# Patient Record
Sex: MALE | Race: WHITE | NOT HISPANIC OR LATINO | URBAN - METROPOLITAN AREA
[De-identification: names, ages, dates, MRNs, and addresses within clinical notes are randomized per-mention and may not be internally consistent; named-entity substitution may affect disease eponyms.]

---

## 2020-12-14 ENCOUNTER — LAB REQUISITION (OUTPATIENT)
Dept: LAB | Facility: HOSPITAL | Age: 40
End: 2020-12-14
Payer: COMMERCIAL

## 2020-12-14 DIAGNOSIS — B95.8 UNSPECIFIED STAPHYLOCOCCUS AS THE CAUSE OF DISEASES CLASSIFIED ELSEWHERE: ICD-10-CM

## 2020-12-14 PROCEDURE — 87070 CULTURE OTHR SPECIMN AEROBIC: CPT | Performed by: DERMATOLOGY

## 2020-12-14 PROCEDURE — 87186 SC STD MICRODIL/AGAR DIL: CPT | Performed by: DERMATOLOGY

## 2020-12-14 PROCEDURE — 87205 SMEAR GRAM STAIN: CPT | Performed by: DERMATOLOGY

## 2020-12-17 LAB
BACTERIA WND AEROBE CULT: ABNORMAL
BACTERIA WND AEROBE CULT: ABNORMAL
GRAM STN SPEC: ABNORMAL
GRAM STN SPEC: ABNORMAL

## 2024-01-01 ENCOUNTER — APPOINTMENT (EMERGENCY)
Dept: RADIOLOGY | Facility: HOSPITAL | Age: 44
DRG: 064 | End: 2024-01-01
Payer: COMMERCIAL

## 2024-01-01 ENCOUNTER — APPOINTMENT (INPATIENT)
Dept: RADIOLOGY | Facility: HOSPITAL | Age: 44
DRG: 064 | End: 2024-01-01
Payer: COMMERCIAL

## 2024-01-01 ENCOUNTER — APPOINTMENT (OUTPATIENT)
Dept: RADIOLOGY | Facility: HOSPITAL | Age: 44
DRG: 064 | End: 2024-01-01
Payer: COMMERCIAL

## 2024-01-01 ENCOUNTER — HOSPITAL ENCOUNTER (INPATIENT)
Facility: HOSPITAL | Age: 44
LOS: 1 days | DRG: 064 | End: 2024-10-08
Attending: SURGERY | Admitting: SURGERY
Payer: COMMERCIAL

## 2024-01-01 VITALS
BODY MASS INDEX: 31.38 KG/M2 | RESPIRATION RATE: 17 BRPM | HEIGHT: 73 IN | SYSTOLIC BLOOD PRESSURE: 104 MMHG | WEIGHT: 236.77 LBS | HEART RATE: 116 BPM | TEMPERATURE: 98.6 F | DIASTOLIC BLOOD PRESSURE: 85 MMHG | OXYGEN SATURATION: 98 %

## 2024-01-01 DIAGNOSIS — I63.89 CEREBROVASCULAR ACCIDENT (CVA) DUE TO OTHER MECHANISM (HCC): Primary | ICD-10-CM

## 2024-01-01 DIAGNOSIS — I62.9 INTRACRANIAL HEMORRHAGE (HCC): ICD-10-CM

## 2024-01-01 LAB
ALBUMIN SERPL BCG-MCNC: 3.8 G/DL (ref 3.5–5)
ALP SERPL-CCNC: 85 U/L (ref 34–104)
ALT SERPL W P-5'-P-CCNC: 26 U/L (ref 7–52)
ANION GAP SERPL CALCULATED.3IONS-SCNC: 12 MMOL/L (ref 4–13)
ANION GAP SERPL CALCULATED.3IONS-SCNC: 14 MMOL/L (ref 4–13)
ANION GAP SERPL CALCULATED.3IONS-SCNC: 15 MMOL/L (ref 4–13)
ANION GAP SERPL CALCULATED.3IONS-SCNC: 9 MMOL/L (ref 4–13)
ANISOCYTOSIS BLD QL SMEAR: PRESENT
APTT PPP: 25 SECONDS (ref 23–34)
AST SERPL W P-5'-P-CCNC: 40 U/L (ref 13–39)
ATRIAL RATE: 153 BPM
AUER BODIES BLD QL SMEAR: PRESENT
AUER BODIES BLD QL SMEAR: PRESENT
BACTERIA UR QL AUTO: ABNORMAL /HPF
BASE EXCESS BLDA CALC-SCNC: -2.5 MMOL/L
BASO STIPL BLD QL SMEAR: PRESENT
BASOPHILS # BLD MANUAL: 0 THOUSAND/UL (ref 0–0.1)
BASOPHILS NFR MAR MANUAL: 0 % (ref 0–1)
BILIRUB SERPL-MCNC: 1.38 MG/DL (ref 0.2–1)
BILIRUB UR QL STRIP: NEGATIVE
BLASTS ABSOLUTE COUNT: 33.08 THOUSAND/UL (ref 0–0)
BLASTS ABSOLUTE COUNT: 37.1 THOUSAND/UL (ref 0–0)
BLASTS NFR BLD MANUAL: 39 %
BLASTS NFR BLD MANUAL: 40 %
BUN SERPL-MCNC: 16 MG/DL (ref 5–25)
BUN SERPL-MCNC: 17 MG/DL (ref 5–25)
CA-I BLD-SCNC: 1.12 MMOL/L (ref 1.12–1.32)
CALCIUM SERPL-MCNC: 7.9 MG/DL (ref 8.4–10.2)
CALCIUM SERPL-MCNC: 8.2 MG/DL (ref 8.4–10.2)
CALCIUM SERPL-MCNC: 8.5 MG/DL (ref 8.4–10.2)
CALCIUM SERPL-MCNC: 8.6 MG/DL (ref 8.4–10.2)
CHLORIDE SERPL-SCNC: 101 MMOL/L (ref 96–108)
CHLORIDE SERPL-SCNC: 101 MMOL/L (ref 96–108)
CHLORIDE SERPL-SCNC: 106 MMOL/L (ref 96–108)
CHLORIDE SERPL-SCNC: 116 MMOL/L (ref 96–108)
CLARITY UR: ABNORMAL
CO2 SERPL-SCNC: 20 MMOL/L (ref 21–32)
CO2 SERPL-SCNC: 22 MMOL/L (ref 21–32)
CO2 SERPL-SCNC: 23 MMOL/L (ref 21–32)
CO2 SERPL-SCNC: 24 MMOL/L (ref 21–32)
COLOR UR: ABNORMAL
CREAT SERPL-MCNC: 1.19 MG/DL (ref 0.6–1.3)
CREAT SERPL-MCNC: 1.21 MG/DL (ref 0.6–1.3)
CREAT SERPL-MCNC: 1.22 MG/DL (ref 0.6–1.3)
CREAT SERPL-MCNC: 1.28 MG/DL (ref 0.6–1.3)
DIFFERENTIAL COMMENT: ABNORMAL
EOSINOPHIL # BLD MANUAL: 1.9 THOUSAND/UL (ref 0–0.4)
EOSINOPHIL NFR BLD MANUAL: 2 % (ref 0–6)
ERYTHROCYTE [DISTWIDTH] IN BLOOD BY AUTOMATED COUNT: 18.5 % (ref 11.6–15.1)
ERYTHROCYTE [DISTWIDTH] IN BLOOD BY AUTOMATED COUNT: 18.9 % (ref 11.6–15.1)
ERYTHROCYTE [DISTWIDTH] IN BLOOD BY AUTOMATED COUNT: 19.2 % (ref 11.6–15.1)
ERYTHROCYTE [DISTWIDTH] IN BLOOD BY AUTOMATED COUNT: 19.3 % (ref 11.6–15.1)
EST. AVERAGE GLUCOSE BLD GHB EST-MCNC: 103 MG/DL
GFR SERPL CREATININE-BSD FRML MDRD: 68 ML/MIN/1.73SQ M
GFR SERPL CREATININE-BSD FRML MDRD: 72 ML/MIN/1.73SQ M
GFR SERPL CREATININE-BSD FRML MDRD: 72 ML/MIN/1.73SQ M
GFR SERPL CREATININE-BSD FRML MDRD: 74 ML/MIN/1.73SQ M
GLUCOSE SERPL-MCNC: 153 MG/DL (ref 65–140)
GLUCOSE SERPL-MCNC: 154 MG/DL (ref 65–140)
GLUCOSE SERPL-MCNC: 170 MG/DL (ref 65–140)
GLUCOSE SERPL-MCNC: 175 MG/DL (ref 65–140)
GLUCOSE SERPL-MCNC: 179 MG/DL (ref 65–140)
GLUCOSE SERPL-MCNC: 235 MG/DL (ref 65–140)
GLUCOSE SERPL-MCNC: 240 MG/DL (ref 65–140)
GLUCOSE SERPL-MCNC: 260 MG/DL (ref 65–140)
GLUCOSE SERPL-MCNC: 349 MG/DL (ref 65–140)
GLUCOSE SERPL-MCNC: 394 MG/DL (ref 65–140)
GLUCOSE UR STRIP-MCNC: NEGATIVE MG/DL
HBA1C MFR BLD: 5.2 %
HCO3 BLDA-SCNC: 23.5 MMOL/L (ref 22–28)
HCT VFR BLD AUTO: 31.8 % (ref 36.5–49.3)
HCT VFR BLD AUTO: 32.7 % (ref 36.5–49.3)
HCT VFR BLD AUTO: 36.3 % (ref 36.5–49.3)
HCT VFR BLD AUTO: 37.1 % (ref 36.5–49.3)
HGB BLD-MCNC: 10.3 G/DL (ref 12–17)
HGB BLD-MCNC: 11.2 G/DL (ref 12–17)
HGB BLD-MCNC: 11.9 G/DL (ref 12–17)
HGB BLD-MCNC: 9.8 G/DL (ref 12–17)
HGB UR QL STRIP.AUTO: ABNORMAL
HIV 1+2 AB+HIV1 P24 AG SERPL QL IA: NORMAL
HIV1 P24 AG SER QL: NORMAL
INR PPP: 1.24 (ref 0.85–1.19)
KETONES UR STRIP-MCNC: NEGATIVE MG/DL
LACTATE SERPL-SCNC: 1.5 MMOL/L (ref 0.5–2)
LACTATE SERPL-SCNC: 2 MMOL/L (ref 0.5–2)
LACTATE SERPL-SCNC: 3.9 MMOL/L (ref 0.5–2)
LEUKOCYTE ESTERASE UR QL STRIP: ABNORMAL
LYMPHOCYTES # BLD AUTO: 6.62 THOUSAND/UL (ref 0.6–4.47)
LYMPHOCYTES # BLD AUTO: 6.66 THOUSAND/UL (ref 0.6–4.47)
LYMPHOCYTES # BLD AUTO: 7 % (ref 14–44)
LYMPHOCYTES # BLD AUTO: 8 %
MACROCYTES BLD QL AUTO: PRESENT
MACROCYTES BLD QL AUTO: PRESENT
MAGNESIUM SERPL-MCNC: 2 MG/DL (ref 1.9–2.7)
MAGNESIUM SERPL-MCNC: 2.4 MG/DL (ref 1.9–2.7)
MCH RBC QN AUTO: 30.3 PG (ref 26.8–34.3)
MCH RBC QN AUTO: 30.7 PG (ref 26.8–34.3)
MCH RBC QN AUTO: 30.8 PG (ref 26.8–34.3)
MCH RBC QN AUTO: 31.2 PG (ref 26.8–34.3)
MCHC RBC AUTO-ENTMCNC: 30.8 G/DL (ref 31.4–37.4)
MCHC RBC AUTO-ENTMCNC: 30.9 G/DL (ref 31.4–37.4)
MCHC RBC AUTO-ENTMCNC: 31.5 G/DL (ref 31.4–37.4)
MCHC RBC AUTO-ENTMCNC: 32.1 G/DL (ref 31.4–37.4)
MCV RBC AUTO: 100 FL (ref 82–98)
MCV RBC AUTO: 97 FL (ref 82–98)
MCV RBC AUTO: 98 FL (ref 82–98)
MCV RBC AUTO: 98 FL (ref 82–98)
METAMYELOCYTES # BLD MANUAL: 1.65 THOUSAND/UL (ref 0–0.1)
METAMYELOCYTES NFR BLD MANUAL: 2 % (ref 0–1)
MONOCYTES # BLD AUTO: 32.25 THOUSAND/UL (ref 0–1.22)
MONOCYTES # BLD AUTO: 33.3 THOUSAND/UL (ref 0–1.22)
MONOCYTES NFR BLD AUTO: 39 % (ref 4–12)
MONOCYTES NFR BLD: 35 % (ref 4–12)
MYELOCYTE ABSOLUTE CT: 2.85 THOUSAND/UL (ref 0–0.1)
MYELOCYTES # BLD MANUAL: 0.83 THOUSAND/UL (ref 0–0.1)
MYELOCYTES NFR BLD MANUAL: 1 % (ref 0–1)
MYELOCYTES NFR BLD MANUAL: 3 % (ref 0–1)
NEUTROPHILS # BLD MANUAL: 13.32 THOUSAND/UL (ref 1.85–7.62)
NEUTS BAND NFR BLD MANUAL: 3 % (ref 0–8)
NEUTS BAND NFR BLD MANUAL: 3 % (ref 0–8)
NEUTS SEG # BLD: 8.27 THOUSAND/UL (ref 1.81–6.82)
NEUTS SEG NFR BLD AUTO: 11 % (ref 43–75)
NEUTS SEG NFR BLD AUTO: 7 %
NITRITE UR QL STRIP: NEGATIVE
NON-SQ EPI CELLS URNS QL MICRO: ABNORMAL /HPF
NRBC BLD AUTO-RTO: 12 /100 WBC (ref 0–2)
NRBC BLD AUTO-RTO: 18 /100 WBC (ref 0–2)
NRBC BLD AUTO-RTO: 5 /100 WBCS
O2 CT BLDA-SCNC: 17 ML/DL (ref 16–23)
OSMOLALITY UR/SERPL-RTO: 315 MMOL/KG (ref 282–298)
OSMOLALITY UR/SERPL-RTO: 322 MMOL/KG (ref 282–298)
OSMOLALITY UR/SERPL-RTO: 326 MMOL/KG (ref 282–298)
OXYHGB MFR BLDA: 96.8 % (ref 94–97)
P AXIS: 35 DEGREES
PAPPENHEIMER BODIES: PRESENT
PATHOLOGY REVIEW: YES
PCO2 BLDA: 45.5 MM HG (ref 36–44)
PH BLDA: 7.33 [PH] (ref 7.35–7.45)
PH UR STRIP.AUTO: 6 [PH]
PHOSPHATE SERPL-MCNC: 2.6 MG/DL (ref 2.7–4.5)
PHOSPHATE SERPL-MCNC: 4 MG/DL (ref 2.7–4.5)
PLATELET # BLD AUTO: 7 THOUSANDS/UL (ref 149–390)
PLATELET # BLD AUTO: 7 THOUSANDS/UL (ref 149–390)
PLATELET # BLD AUTO: 8 THOUSANDS/UL (ref 149–390)
PLATELET # BLD AUTO: 8 THOUSANDS/UL (ref 149–390)
PLATELET BLD QL SMEAR: ABNORMAL
PLATELET BLD QL SMEAR: ABNORMAL
PO2 BLDA: 92.5 MM HG (ref 75–129)
POIKILOCYTOSIS BLD QL SMEAR: PRESENT
POLYCHROMASIA BLD QL SMEAR: PRESENT
POLYCHROMASIA BLD QL SMEAR: PRESENT
POTASSIUM SERPL-SCNC: 3.6 MMOL/L (ref 3.5–5.3)
POTASSIUM SERPL-SCNC: 3.7 MMOL/L (ref 3.5–5.3)
POTASSIUM SERPL-SCNC: 3.9 MMOL/L (ref 3.5–5.3)
POTASSIUM SERPL-SCNC: 4.1 MMOL/L (ref 3.5–5.3)
PR INTERVAL: 88 MS
PROT SERPL-MCNC: 6.9 G/DL (ref 6.4–8.4)
PROT UR STRIP-MCNC: ABNORMAL MG/DL
PROTHROMBIN TIME: 15.9 SECONDS (ref 12.3–15)
QRS AXIS: 140 DEGREES
QRSD INTERVAL: 83 MS
QT INTERVAL: 296 MS
QTC INTERVAL: 473 MS
RBC # BLD AUTO: 3.19 MILLION/UL (ref 3.88–5.62)
RBC # BLD AUTO: 3.34 MILLION/UL (ref 3.88–5.62)
RBC # BLD AUTO: 3.7 MILLION/UL (ref 3.88–5.62)
RBC # BLD AUTO: 3.81 MILLION/UL (ref 3.88–5.62)
RBC #/AREA URNS AUTO: ABNORMAL /HPF
RBC MORPH BLD: PRESENT
RBC MORPH BLD: PRESENT
SMUDGE CELLS BLD QL SMEAR: PRESENT
SMUDGE CELLS BLD QL SMEAR: PRESENT
SODIUM SERPL-SCNC: 136 MMOL/L (ref 135–147)
SODIUM SERPL-SCNC: 138 MMOL/L (ref 135–147)
SODIUM SERPL-SCNC: 140 MMOL/L (ref 135–147)
SODIUM SERPL-SCNC: 149 MMOL/L (ref 135–147)
SP GR UR STRIP.AUTO: 1.03 (ref 1–1.03)
SPECIMEN SOURCE: ABNORMAL
T WAVE AXIS: 20 DEGREES
TOTAL CELLS COUNTED SPEC: 100
UROBILINOGEN UR STRIP-ACNC: <2 MG/DL
VENTRICULAR RATE: 153 BPM
WBC # BLD AUTO: 75.3 THOUSAND/UL (ref 4.31–10.16)
WBC # BLD AUTO: 84.12 THOUSAND/UL (ref 4.31–10.16)
WBC # BLD AUTO: 95.14 THOUSAND/UL (ref 4.31–10.16)
WBC # BLD AUTO: 97.58 THOUSAND/UL (ref 4.31–10.16)
WBC #/AREA URNS AUTO: ABNORMAL /HPF
WBC NRBC COR # BLD: 82.69 THOUSAND/UL (ref 4.31–10.16)

## 2024-01-01 PROCEDURE — 36620 INSERTION CATHETER ARTERY: CPT | Performed by: REGISTERED NURSE

## 2024-01-01 PROCEDURE — 85027 COMPLETE CBC AUTOMATED: CPT | Performed by: REGISTERED NURSE

## 2024-01-01 PROCEDURE — 72170 X-RAY EXAM OF PELVIS: CPT

## 2024-01-01 PROCEDURE — 85007 BL SMEAR W/DIFF WBC COUNT: CPT | Performed by: REGISTERED NURSE

## 2024-01-01 PROCEDURE — 85730 THROMBOPLASTIN TIME PARTIAL: CPT | Performed by: SURGERY

## 2024-01-01 PROCEDURE — 83930 ASSAY OF BLOOD OSMOLALITY: CPT | Performed by: REGISTERED NURSE

## 2024-01-01 PROCEDURE — 96375 TX/PRO/DX INJ NEW DRUG ADDON: CPT

## 2024-01-01 PROCEDURE — 84100 ASSAY OF PHOSPHORUS: CPT | Performed by: REGISTERED NURSE

## 2024-01-01 PROCEDURE — 82948 REAGENT STRIP/BLOOD GLUCOSE: CPT

## 2024-01-01 PROCEDURE — 86901 BLOOD TYPING SEROLOGIC RH(D): CPT | Performed by: SURGERY

## 2024-01-01 PROCEDURE — 85014 HEMATOCRIT: CPT

## 2024-01-01 PROCEDURE — 71045 X-RAY EXAM CHEST 1 VIEW: CPT

## 2024-01-01 PROCEDURE — 94760 N-INVAS EAR/PLS OXIMETRY 1: CPT

## 2024-01-01 PROCEDURE — 99292 CRITICAL CARE ADDL 30 MIN: CPT | Performed by: SURGERY

## 2024-01-01 PROCEDURE — 86900 BLOOD TYPING SEROLOGIC ABO: CPT | Performed by: SURGERY

## 2024-01-01 PROCEDURE — 83605 ASSAY OF LACTIC ACID: CPT | Performed by: REGISTERED NURSE

## 2024-01-01 PROCEDURE — 02HV33Z INSERTION OF INFUSION DEVICE INTO SUPERIOR VENA CAVA, PERCUTANEOUS APPROACH: ICD-10-PCS | Performed by: STUDENT IN AN ORGANIZED HEALTH CARE EDUCATION/TRAINING PROGRAM

## 2024-01-01 PROCEDURE — 70450 CT HEAD/BRAIN W/O DYE: CPT

## 2024-01-01 PROCEDURE — NC001 PR NO CHARGE: Performed by: NEUROLOGICAL SURGERY

## 2024-01-01 PROCEDURE — 80053 COMPREHEN METABOLIC PANEL: CPT | Performed by: SURGERY

## 2024-01-01 PROCEDURE — 82330 ASSAY OF CALCIUM: CPT | Performed by: REGISTERED NURSE

## 2024-01-01 PROCEDURE — 76937 US GUIDE VASCULAR ACCESS: CPT | Performed by: STUDENT IN AN ORGANIZED HEALTH CARE EDUCATION/TRAINING PROGRAM

## 2024-01-01 PROCEDURE — 70496 CT ANGIOGRAPHY HEAD: CPT

## 2024-01-01 PROCEDURE — 85027 COMPLETE CBC AUTOMATED: CPT | Performed by: SURGERY

## 2024-01-01 PROCEDURE — 71260 CT THORAX DX C+: CPT

## 2024-01-01 PROCEDURE — NC001 PR NO CHARGE: Performed by: REGISTERED NURSE

## 2024-01-01 PROCEDURE — 99291 CRITICAL CARE FIRST HOUR: CPT | Performed by: SURGERY

## 2024-01-01 PROCEDURE — 74177 CT ABD & PELVIS W/CONTRAST: CPT

## 2024-01-01 PROCEDURE — 83735 ASSAY OF MAGNESIUM: CPT | Performed by: REGISTERED NURSE

## 2024-01-01 PROCEDURE — 85007 BL SMEAR W/DIFF WBC COUNT: CPT | Performed by: SURGERY

## 2024-01-01 PROCEDURE — 70498 CT ANGIOGRAPHY NECK: CPT

## 2024-01-01 PROCEDURE — 80048 BASIC METABOLIC PNL TOTAL CA: CPT | Performed by: REGISTERED NURSE

## 2024-01-01 PROCEDURE — 03HY32Z INSERTION OF MONITORING DEVICE INTO UPPER ARTERY, PERCUTANEOUS APPROACH: ICD-10-PCS | Performed by: INTERNAL MEDICINE

## 2024-01-01 PROCEDURE — 31500 INSERT EMERGENCY AIRWAY: CPT | Performed by: EMERGENCY MEDICINE

## 2024-01-01 PROCEDURE — 99238 HOSP IP/OBS DSCHRG MGMT 30/<: CPT | Performed by: INTERNAL MEDICINE

## 2024-01-01 PROCEDURE — 87806 HIV AG W/HIV1&2 ANTB W/OPTIC: CPT | Performed by: REGISTERED NURSE

## 2024-01-01 PROCEDURE — 85025 COMPLETE CBC W/AUTO DIFF WBC: CPT | Performed by: REGISTERED NURSE

## 2024-01-01 PROCEDURE — 82803 BLOOD GASES ANY COMBINATION: CPT

## 2024-01-01 PROCEDURE — 5A1935Z RESPIRATORY VENTILATION, LESS THAN 24 CONSECUTIVE HOURS: ICD-10-PCS | Performed by: INTERNAL MEDICINE

## 2024-01-01 PROCEDURE — 93005 ELECTROCARDIOGRAM TRACING: CPT

## 2024-01-01 PROCEDURE — 4A133B1 MONITORING OF ARTERIAL PRESSURE, PERIPHERAL, PERCUTANEOUS APPROACH: ICD-10-PCS | Performed by: INTERNAL MEDICINE

## 2024-01-01 PROCEDURE — 96374 THER/PROPH/DIAG INJ IV PUSH: CPT

## 2024-01-01 PROCEDURE — 85610 PROTHROMBIN TIME: CPT | Performed by: SURGERY

## 2024-01-01 PROCEDURE — 94002 VENT MGMT INPAT INIT DAY: CPT

## 2024-01-01 PROCEDURE — 99223 1ST HOSP IP/OBS HIGH 75: CPT | Performed by: NEUROLOGICAL SURGERY

## 2024-01-01 PROCEDURE — 94003 VENT MGMT INPAT SUBQ DAY: CPT

## 2024-01-01 PROCEDURE — 82947 ASSAY GLUCOSE BLOOD QUANT: CPT

## 2024-01-01 PROCEDURE — 0BH17EZ INSERTION OF ENDOTRACHEAL AIRWAY INTO TRACHEA, VIA NATURAL OR ARTIFICIAL OPENING: ICD-10-PCS

## 2024-01-01 PROCEDURE — 83036 HEMOGLOBIN GLYCOSYLATED A1C: CPT | Performed by: REGISTERED NURSE

## 2024-01-01 PROCEDURE — 84132 ASSAY OF SERUM POTASSIUM: CPT

## 2024-01-01 PROCEDURE — 99285 EMERGENCY DEPT VISIT HI MDM: CPT

## 2024-01-01 PROCEDURE — 82805 BLOOD GASES W/O2 SATURATION: CPT | Performed by: REGISTERED NURSE

## 2024-01-01 PROCEDURE — 81001 URINALYSIS AUTO W/SCOPE: CPT | Performed by: REGISTERED NURSE

## 2024-01-01 PROCEDURE — 85060 BLOOD SMEAR INTERPRETATION: CPT | Performed by: PATHOLOGY

## 2024-01-01 PROCEDURE — 94150 VITAL CAPACITY TEST: CPT

## 2024-01-01 PROCEDURE — NC001 PR NO CHARGE

## 2024-01-01 PROCEDURE — 72125 CT NECK SPINE W/O DYE: CPT

## 2024-01-01 PROCEDURE — 83605 ASSAY OF LACTIC ACID: CPT | Performed by: SURGERY

## 2024-01-01 PROCEDURE — 86850 RBC ANTIBODY SCREEN: CPT | Performed by: SURGERY

## 2024-01-01 PROCEDURE — 99291 CRITICAL CARE FIRST HOUR: CPT | Performed by: INTERNAL MEDICINE

## 2024-01-01 PROCEDURE — 84295 ASSAY OF SERUM SODIUM: CPT

## 2024-01-01 PROCEDURE — 82330 ASSAY OF CALCIUM: CPT

## 2024-01-01 PROCEDURE — 86361 T CELL ABSOLUTE COUNT: CPT | Performed by: REGISTERED NURSE

## 2024-01-01 PROCEDURE — NC001 PR NO CHARGE: Performed by: STUDENT IN AN ORGANIZED HEALTH CARE EDUCATION/TRAINING PROGRAM

## 2024-01-01 PROCEDURE — 36556 INSERT NON-TUNNEL CV CATH: CPT | Performed by: STUDENT IN AN ORGANIZED HEALTH CARE EDUCATION/TRAINING PROGRAM

## 2024-01-01 RX ORDER — INSULIN LISPRO 100 [IU]/ML
1-6 INJECTION, SOLUTION INTRAVENOUS; SUBCUTANEOUS EVERY 6 HOURS SCHEDULED
Status: DISCONTINUED | OUTPATIENT
Start: 2024-01-01 | End: 2024-01-01

## 2024-01-01 RX ORDER — FENTANYL CITRATE 50 UG/ML
INJECTION, SOLUTION INTRAMUSCULAR; INTRAVENOUS CODE/TRAUMA/SEDATION MEDICATION
Status: COMPLETED | OUTPATIENT
Start: 2024-01-01 | End: 2024-01-01

## 2024-01-01 RX ORDER — EPINEPHRINE 1 MG/ML
INJECTION, SOLUTION, CONCENTRATE INTRAVENOUS
Status: DISPENSED
Start: 2024-01-01 | End: 2024-01-01

## 2024-01-01 RX ORDER — LORAZEPAM 2 MG/ML
1 INJECTION INTRAMUSCULAR
Status: DISCONTINUED | OUTPATIENT
Start: 2024-01-01 | End: 2024-01-01 | Stop reason: HOSPADM

## 2024-01-01 RX ORDER — FENTANYL CITRATE 50 UG/ML
50 INJECTION, SOLUTION INTRAMUSCULAR; INTRAVENOUS
Status: DISCONTINUED | OUTPATIENT
Start: 2024-01-01 | End: 2024-01-01 | Stop reason: HOSPADM

## 2024-01-01 RX ORDER — SODIUM CHLORIDE 3 G/100ML
250 INJECTION, SOLUTION INTRAVENOUS ONCE
Status: COMPLETED | OUTPATIENT
Start: 2024-01-01 | End: 2024-01-01

## 2024-01-01 RX ORDER — NALOXONE HYDROCHLORIDE 1 MG/ML
1 INJECTION PARENTERAL ONCE
Status: COMPLETED | OUTPATIENT
Start: 2024-01-01 | End: 2024-01-01

## 2024-01-01 RX ORDER — NICARDIPINE HYDROCHLORIDE 2.5 MG/ML
INJECTION INTRAVENOUS
Status: DISPENSED
Start: 2024-01-01 | End: 2024-01-01

## 2024-01-01 RX ORDER — LEVETIRACETAM 500 MG/5ML
1500 INJECTION, SOLUTION, CONCENTRATE INTRAVENOUS EVERY 12 HOURS SCHEDULED
Status: DISCONTINUED | OUTPATIENT
Start: 2024-01-01 | End: 2024-01-01

## 2024-01-01 RX ORDER — LEVETIRACETAM 500 MG/5ML
3000 INJECTION, SOLUTION, CONCENTRATE INTRAVENOUS ONCE
Status: COMPLETED | OUTPATIENT
Start: 2024-01-01 | End: 2024-01-01

## 2024-01-01 RX ORDER — PROPOFOL 10 MG/ML
INJECTION, EMULSION INTRAVENOUS CODE/TRAUMA/SEDATION MEDICATION
Status: COMPLETED | OUTPATIENT
Start: 2024-01-01 | End: 2024-01-01

## 2024-01-01 RX ORDER — SODIUM CHLORIDE 3 G/100ML
250 INJECTION, SOLUTION INTRAVENOUS ONCE
Status: DISCONTINUED | OUTPATIENT
Start: 2024-01-01 | End: 2024-01-01

## 2024-01-01 RX ORDER — LORAZEPAM 2 MG/ML
4 INJECTION INTRAMUSCULAR ONCE
Status: COMPLETED | OUTPATIENT
Start: 2024-01-01 | End: 2024-01-01

## 2024-01-01 RX ORDER — HYDRALAZINE HYDROCHLORIDE 20 MG/ML
10 INJECTION INTRAMUSCULAR; INTRAVENOUS EVERY 4 HOURS PRN
Status: DISCONTINUED | OUTPATIENT
Start: 2024-01-01 | End: 2024-01-01 | Stop reason: HOSPADM

## 2024-01-01 RX ORDER — HYDROMORPHONE HCL/PF 1 MG/ML
0.3 SYRINGE (ML) INJECTION EVERY 2 HOUR PRN
Status: DISCONTINUED | OUTPATIENT
Start: 2024-01-01 | End: 2024-01-01 | Stop reason: HOSPADM

## 2024-01-01 RX ORDER — PROPOFOL 10 MG/ML
5-50 INJECTION, EMULSION INTRAVENOUS
Status: DISCONTINUED | OUTPATIENT
Start: 2024-01-01 | End: 2024-01-01

## 2024-01-01 RX ORDER — LEVETIRACETAM 500 MG/5ML
INJECTION, SOLUTION, CONCENTRATE INTRAVENOUS CODE/TRAUMA/SEDATION MEDICATION
Status: COMPLETED | OUTPATIENT
Start: 2024-01-01 | End: 2024-01-01

## 2024-01-01 RX ORDER — MANNITOL 250 MG/ML
INJECTION, SOLUTION INTRAVENOUS
Status: COMPLETED
Start: 2024-01-01 | End: 2024-01-01

## 2024-01-01 RX ORDER — EPINEPHRINE 0.1 MG/ML
INJECTION INTRAVENOUS
Status: DISPENSED
Start: 2024-01-01 | End: 2024-01-01

## 2024-01-01 RX ORDER — PANTOPRAZOLE SODIUM 40 MG/10ML
40 INJECTION, POWDER, LYOPHILIZED, FOR SOLUTION INTRAVENOUS EVERY 12 HOURS SCHEDULED
Status: DISCONTINUED | OUTPATIENT
Start: 2024-01-01 | End: 2024-01-01

## 2024-01-01 RX ORDER — IODIXANOL 320 MG/ML
100 INJECTION, SOLUTION INTRAVASCULAR
Status: COMPLETED | OUTPATIENT
Start: 2024-01-01 | End: 2024-01-01

## 2024-01-01 RX ORDER — SUCCINYLCHOLINE/SOD CL,ISO/PF 100 MG/5ML
SYRINGE (ML) INTRAVENOUS CODE/TRAUMA/SEDATION MEDICATION
Status: COMPLETED | OUTPATIENT
Start: 2024-01-01 | End: 2024-01-01

## 2024-01-01 RX ORDER — NOREPINEPHRINE BITARTRATE 1 MG/ML
INJECTION, SOLUTION INTRAVENOUS
Status: DISPENSED
Start: 2024-01-01 | End: 2024-01-01

## 2024-01-01 RX ORDER — GLYCOPYRROLATE 0.2 MG/ML
0.1 INJECTION INTRAMUSCULAR; INTRAVENOUS EVERY 4 HOURS PRN
Status: DISCONTINUED | OUTPATIENT
Start: 2024-01-01 | End: 2024-01-01 | Stop reason: HOSPADM

## 2024-01-01 RX ORDER — ETOMIDATE 2 MG/ML
INJECTION INTRAVENOUS CODE/TRAUMA/SEDATION MEDICATION
Status: COMPLETED | OUTPATIENT
Start: 2024-01-01 | End: 2024-01-01

## 2024-01-01 RX ORDER — CHLORHEXIDINE GLUCONATE ORAL RINSE 1.2 MG/ML
15 SOLUTION DENTAL EVERY 12 HOURS SCHEDULED
Status: DISCONTINUED | OUTPATIENT
Start: 2024-01-01 | End: 2024-01-01

## 2024-01-01 RX ORDER — ONDANSETRON 2 MG/ML
1 INJECTION INTRAMUSCULAR; INTRAVENOUS ONCE
Status: COMPLETED | OUTPATIENT
Start: 2024-01-01 | End: 2024-01-01

## 2024-01-01 RX ORDER — POTASSIUM CHLORIDE 20MEQ/15ML
40 LIQUID (ML) ORAL ONCE
Status: COMPLETED | OUTPATIENT
Start: 2024-01-01 | End: 2024-01-01

## 2024-01-01 RX ORDER — LORAZEPAM 2 MG/ML
INJECTION INTRAMUSCULAR
Status: COMPLETED
Start: 2024-01-01 | End: 2024-01-01

## 2024-01-01 RX ORDER — MANNITOL 20 G/100ML
50 INJECTION, SOLUTION INTRAVENOUS ONCE
Status: DISCONTINUED | OUTPATIENT
Start: 2024-01-01 | End: 2024-01-01

## 2024-01-01 RX ORDER — HALOPERIDOL 5 MG/ML
0.5 INJECTION INTRAMUSCULAR EVERY 2 HOUR PRN
Status: DISCONTINUED | OUTPATIENT
Start: 2024-01-01 | End: 2024-01-01 | Stop reason: HOSPADM

## 2024-01-01 RX ADMIN — ETOMIDATE 10 MG: 20 INJECTION, SOLUTION INTRAVENOUS at 17:46

## 2024-01-01 RX ADMIN — VASOPRESSIN 0.04 UNITS/MIN: 20 INJECTION INTRAVENOUS at 21:52

## 2024-01-01 RX ADMIN — NOREPINEPHRINE BITARTRATE 4 MCG/MIN: 1 INJECTION, SOLUTION, CONCENTRATE INTRAVENOUS at 21:51

## 2024-01-01 RX ADMIN — LEVETIRACETAM 1000 MG: 100 INJECTION, SOLUTION INTRAVENOUS at 17:51

## 2024-01-01 RX ADMIN — LEVETIRACETAM 1500 MG: 100 INJECTION, SOLUTION INTRAVENOUS at 08:52

## 2024-01-01 RX ADMIN — PROPOFOL 40 MCG/KG/MIN: 10 INJECTION, EMULSION INTRAVENOUS at 20:23

## 2024-01-01 RX ADMIN — MANNITOL 12.5 G: 12.5 INJECTION, SOLUTION INTRAVENOUS at 20:00

## 2024-01-01 RX ADMIN — MANNITOL 12.5 G: 12.5 INJECTION, SOLUTION INTRAVENOUS at 22:41

## 2024-01-01 RX ADMIN — SODIUM CHLORIDE 3 UNITS/HR: 9 INJECTION, SOLUTION INTRAVENOUS at 23:31

## 2024-01-01 RX ADMIN — NICARDIPINE HYDROCHLORIDE 5 MG/HR: 25 INJECTION, SOLUTION INTRAVENOUS at 18:30

## 2024-01-01 RX ADMIN — FENTANYL CITRATE 100 MCG: 50 INJECTION INTRAMUSCULAR; INTRAVENOUS at 17:52

## 2024-01-01 RX ADMIN — SODIUM CHLORIDE 1000 ML: 0.9 INJECTION, SOLUTION INTRAVENOUS at 20:36

## 2024-01-01 RX ADMIN — HYDRALAZINE HYDROCHLORIDE 10 MG: 20 INJECTION, SOLUTION INTRAMUSCULAR; INTRAVENOUS at 20:22

## 2024-01-01 RX ADMIN — POTASSIUM CHLORIDE 40 MEQ: 1.5 SOLUTION ORAL at 20:37

## 2024-01-01 RX ADMIN — CHLORHEXIDINE GLUCONATE 0.12% ORAL RINSE 15 ML: 1.2 LIQUID ORAL at 20:37

## 2024-01-01 RX ADMIN — SODIUM CHLORIDE 250 ML: 3 INJECTION, SOLUTION INTRAVENOUS at 22:40

## 2024-01-01 RX ADMIN — PROPOFOL 40 MCG/KG/MIN: 10 INJECTION, EMULSION INTRAVENOUS at 19:41

## 2024-01-01 RX ADMIN — PANTOPRAZOLE SODIUM 40 MG: 40 INJECTION, POWDER, FOR SOLUTION INTRAVENOUS at 08:52

## 2024-01-01 RX ADMIN — FENTANYL CITRATE 50 MCG: 50 INJECTION INTRAMUSCULAR; INTRAVENOUS at 18:05

## 2024-01-01 RX ADMIN — PANTOPRAZOLE SODIUM 40 MG: 40 INJECTION, POWDER, FOR SOLUTION INTRAVENOUS at 23:14

## 2024-01-01 RX ADMIN — INSULIN LISPRO 3 UNITS: 100 INJECTION, SOLUTION INTRAVENOUS; SUBCUTANEOUS at 19:40

## 2024-01-01 RX ADMIN — CHLORHEXIDINE GLUCONATE 0.12% ORAL RINSE 15 ML: 1.2 LIQUID ORAL at 08:52

## 2024-01-01 RX ADMIN — VASOPRESSIN 0.04 UNITS/MIN: 20 INJECTION INTRAVENOUS at 04:35

## 2024-01-01 RX ADMIN — Medication 100 MG: at 17:46

## 2024-01-01 RX ADMIN — LEVETIRACETAM 3000 MG: 100 INJECTION, SOLUTION INTRAVENOUS at 20:31

## 2024-01-01 RX ADMIN — FENTANYL CITRATE 50 MCG: 50 INJECTION INTRAMUSCULAR; INTRAVENOUS at 18:00

## 2024-01-01 RX ADMIN — IODIXANOL 100 ML: 320 INJECTION, SOLUTION INTRAVASCULAR at 18:15

## 2024-01-01 RX ADMIN — NICARDIPINE HYDROCHLORIDE 15 MG/HR: 25 INJECTION, SOLUTION INTRAVENOUS at 20:20

## 2024-01-01 RX ADMIN — NOREPINEPHRINE BITARTRATE 16 MCG/MIN: 1 INJECTION, SOLUTION, CONCENTRATE INTRAVENOUS at 05:50

## 2024-01-01 RX ADMIN — LORAZEPAM 4 MG: 2 INJECTION INTRAMUSCULAR at 20:43

## 2024-01-01 RX ADMIN — PROPOFOL 60 MG: 10 INJECTION, EMULSION INTRAVENOUS at 17:55

## 2024-01-01 RX ADMIN — LORAZEPAM 4 MG: 2 INJECTION INTRAMUSCULAR; INTRAVENOUS at 20:43

## 2024-01-09 ENCOUNTER — OFFICE VISIT (OUTPATIENT)
Dept: FAMILY MEDICINE CLINIC | Facility: CLINIC | Age: 44
End: 2024-01-09
Payer: COMMERCIAL

## 2024-01-09 VITALS
SYSTOLIC BLOOD PRESSURE: 140 MMHG | HEIGHT: 73 IN | RESPIRATION RATE: 18 BRPM | WEIGHT: 244 LBS | BODY MASS INDEX: 32.34 KG/M2 | DIASTOLIC BLOOD PRESSURE: 98 MMHG | OXYGEN SATURATION: 98 % | HEART RATE: 86 BPM | TEMPERATURE: 96.5 F

## 2024-01-09 DIAGNOSIS — M54.6 ACUTE LEFT-SIDED THORACIC BACK PAIN: Primary | ICD-10-CM

## 2024-01-09 DIAGNOSIS — R03.0 ELEVATED BLOOD PRESSURE READING: ICD-10-CM

## 2024-01-09 PROBLEM — I10 HIGH BLOOD PRESSURE: Status: ACTIVE | Noted: 2024-01-01

## 2024-01-09 PROBLEM — I10 HIGH BLOOD PRESSURE: Status: ACTIVE | Noted: 2024-01-09

## 2024-01-09 PROCEDURE — 99204 OFFICE O/P NEW MOD 45 MIN: CPT | Performed by: STUDENT IN AN ORGANIZED HEALTH CARE EDUCATION/TRAINING PROGRAM

## 2024-01-09 RX ORDER — NAPROXEN 500 MG/1
500 TABLET ORAL 2 TIMES DAILY WITH MEALS
Qty: 28 TABLET | Refills: 0 | Status: SHIPPED | OUTPATIENT
Start: 2024-01-09

## 2024-01-09 RX ORDER — CYCLOBENZAPRINE HCL 10 MG
10 TABLET ORAL 3 TIMES DAILY PRN
Qty: 30 TABLET | Refills: 0 | Status: SHIPPED | OUTPATIENT
Start: 2024-01-09

## 2024-01-09 RX ORDER — ACETAMINOPHEN 500 MG
1000 TABLET ORAL EVERY 8 HOURS PRN
Qty: 30 TABLET | Refills: 0 | Status: SHIPPED | OUTPATIENT
Start: 2024-01-09

## 2024-01-09 NOTE — PROGRESS NOTES
Name: Frances Lowery      : 1980      MRN: [id number]  Encounter Provider: Soco Diop MD  Encounter Date: 2024   Encounter department: [facility]    Assessment & Plan     1. Acute left-sided thoracic back pain  Assessment & Plan:  Onset 5 days ago after self inflicted injury  Symptoms include pain at the medial scapular region and left cervical neck  PE significant for tenderness on palpation of aforementioned regions.     Pain control: Naproxen 500 mg BID x 10-14 days, Tylenol 1000 mg Q 8H PRN, Flexeril 10 mg TID PRN.   Instructed patient to stay adequately hydrated while taking NSAIDs as it can offset the kidneys. Recommended to take Naproxen with food due to adverse effects of GI upset.   Discussed with patient sx may persist for several weeks before they get better.   F/u as needed or sooner if sx worsen   Home PT exercises included in AVS      Orders:  -     naproxen (Naprosyn) 500 mg tablet; Take 1 tablet (500 mg total) by mouth 2 (two) times a day with meals  -     cyclobenzaprine (FLEXERIL) 10 mg tablet; Take 1 tablet (10 mg total) by mouth 3 (three) times a day as needed for muscle spasms  -     acetaminophen (TYLENOL) 500 mg tablet; Take 2 tablets (1,000 mg total) by mouth every 8 (eight) hours as needed for mild pain           Subjective     HPI  Patient presents to establish care. Previously receiving care from urgent care clinic.    Current concerns: patient presents with back pain:  Upper back. Location: left medial scapular region with radiation to left neck. Quality: dull, achy. Severity: 5/10 at worst, 3/10 otherwise. Duration: 5 days. Timing: intermittent. Context: Patient reports punching his hand really hard after having a stressful encounter with his boss that left him feeling angry. Modifying factors: Advil, rest, icy hot are providing relief. Lifting objects and lateral rotation of neck exacerbates pain. Associated signs and symptoms: none.    Review of  Systems   Constitutional:  Negative for fever and unexpected weight change.   HENT:  Negative for congestion and rhinorrhea.    Eyes:  Negative for visual disturbance.   Respiratory:  Negative for chest tightness and shortness of breath.    Cardiovascular:  Negative for chest pain and leg swelling.   Gastrointestinal:  Negative for abdominal distention and abdominal pain.   Genitourinary:  Negative for difficulty urinating.   Musculoskeletal:  Positive for back pain (as per hpi). Negative for myalgias.   Neurological:  Negative for dizziness, syncope and light-headedness.   Psychiatric/Behavioral:  Negative for agitation, dysphoric mood, hallucinations, self-injury and suicidal ideas.        No past medical history on file.  History reviewed. No pertinent surgical history.  Family History   Problem Relation Age of Onset    Hypertension Mother     Diabetes Mother     Hypertension Father     Diabetes Father     No Known Problems Maternal Grandmother     No Known Problems Maternal Grandfather     No Known Problems Paternal Grandmother     No Known Problems Paternal Grandfather      Social History     Socioeconomic History    Marital status: Unknown     Spouse name: None    Number of children: None    Years of education: None    Highest education level: None   Occupational History    None   Tobacco Use    Smoking status: Some Days     Types: Cigarettes    Smokeless tobacco: None   Vaping Use    Vaping status: Some Days    Substances: THC, Flavoring   Substance and Sexual Activity    Alcohol use: Yes     Comment: social    Drug use: Yes     Types: Marijuana    Sexual activity: Yes     Partners: Female   Other Topics Concern    None   Social History Narrative    None     Social Determinants of Health     Financial Resource Strain: Not on file   Food Insecurity: Not on file   Transportation Needs: Not on file   Physical Activity: Not on file   Stress: Not on file   Social Connections: Not on file   Intimate Partner  "Violence: Not on file   Housing Stability: Not on file     No current outpatient medications on file prior to visit.     No Known Allergies    There is no immunization history on file for this patient.    Objective     /98 (BP Location: Left arm, Patient Position: Sitting, Cuff Size: Standard)   Pulse 86   Temp (!) 96.5 °F (35.8 °C) (Temporal)   Resp 18   Ht 6' 1\" (1.854 m)   Wt 111 kg (244 lb)   SpO2 98%   BMI 32.19 kg/m²     Physical Exam  Constitutional:       Appearance: Normal appearance.   HENT:      Head: Normocephalic and atraumatic.   Eyes:      Conjunctiva/sclera: Conjunctivae normal.   Cardiovascular:      Rate and Rhythm: Normal rate.   Pulmonary:      Effort: Pulmonary effort is normal.   Abdominal:      General: There is no distension.   Musculoskeletal:         General: Normal range of motion.      Cervical back: Normal range of motion and neck supple.      Thoracic back: Spasms and tenderness (left scapular and cervical neck region) present.   Neurological:      Mental Status: He is alert and oriented to person, place, and time.   Psychiatric:         Behavior: Behavior normal.         Thought Content: Thought content normal.       Soco Diop MD    "

## 2024-01-09 NOTE — ASSESSMENT & PLAN NOTE
Elevated reading at 140/98 at today's visit.   Discussed goal <140/90. Patient reports fam hx of htn in both parents.  Discussed lifestyle medications and possibility of initiating antihypertensive medication. Advised home ambulatory monitoring for two weeks and RTC with results.

## 2024-01-09 NOTE — ASSESSMENT & PLAN NOTE
Onset 5 days ago after self inflicted injury  Symptoms include pain at the medial scapular region and left cervical neck  PE significant for tenderness on palpation of aforementioned regions.     Pain control: Naproxen 500 mg BID x 10-14 days, Tylenol 1000 mg Q 8H PRN, Flexeril 10 mg TID PRN.   Instructed patient to stay adequately hydrated while taking NSAIDs as it can offset the kidneys. Recommended to take Naproxen with food due to adverse effects of GI upset.   Discussed with patient sx may persist for several weeks before they get better.   F/u as needed or sooner if sx worsen   Home PT exercises included in AVS

## 2024-01-09 NOTE — PATIENT INSTRUCTIONS
Invest in memory foam pillow   Upper Back Exercises   AMBULATORY CARE:   Upper back exercises  help heal and strengthen your back muscles and prevent another injury. Ask your healthcare provider if you need to see a physical therapist for more advanced exercises.  Seek care immediately if:   You have severe pain that prevents you from moving.      Call your doctor if:   Your pain becomes worse.    You have new pain.    You have questions or concerns about your condition, care, or exercise program.    What you need to know about exercise safety:   Do the exercises on a mat or firm surface (not on a bed).  A firm surface will support your spine and prevent upper back pain.    Move slowly and smoothly.  Avoid fast or jerky motions.    Breathe normally.  Do not hold your breath.    Stop if you feel pain.  It is normal to feel some discomfort at first, but you should not feel pain. Regular exercise will help decrease your discomfort over time.    Perform upper back exercises safely:  Ask your healthcare provider which of the following exercises are best for you and how often to do them.  Head rolls:  Sit in a chair or stand. Bring your chin toward your chest and roll your head to the right. Your ear should be over your shoulder. Hold this position for 5 seconds. Roll your head back toward your chest and to the left. Your ear should be over your left shoulder. Hold this position for 5 seconds. Next, roll your head back slowly in a clockwise [place] and repeat 3 times. Do 3 sets of head rolls.         Scapular squeeze:  Sit or stand with your arms at your sides. Squeeze your shoulder blades together and hold for 3 seconds. Relax and repeat 3 times.         Pectoralis stretch:   a doorway. Lift your hands and place them on each side of the door frame or wall slightly higher than your head. Lean forward slowly until you feel a gentle stretch. Hold for 15 seconds. Repeat 3 times, or as directed.         Cat and camel  exercise:  Place your hands and knees on the floor. Arch your back upward toward the ceiling and lower your head. Round out your spine as much as you can. Hold for 5 seconds. Lift your head upward and push your chest downward toward the floor. Hold for 5 seconds. Do 3 sets or as directed.         Bird dog:  Place your hands and knees on the floor. Keep your wrists directly below your shoulders and your knees directly below your hips. Pull your belly button in toward your spine. Do not flatten or arch your back. Tighten your abdominal muscles. Raise one arm straight out so that it is aligned with your head. Next, raise the leg opposite your arm. Hold this position for 15 seconds. Lower your arm and leg slowly and change sides. Do 5 sets.       Follow up with your doctor as directed:  Write down your questions so you remember to ask them during your visits.  © Copyright [employer] 2023 Information is for End User's use only and may not be sold, redistributed or otherwise used for commercial purposes.  The above information is an  only. It is not intended as medical advice for individual conditions or treatments. Talk to your doctor, nurse or pharmacist before following any medical regimen to see if it is safe and effective for you.    How to Take a Blood Pressure Reading   WHAT YOU NEED TO KNOW:   Blood pressure (BP) is the force of blood pushing on the walls of your arteries. Your BP results are written as 2 numbers. The first, or top, number is called systolic BP. This is the pressure caused by your heart pushing blood out to your body. The second, or bottom, number is called diastolic BP. This is the pressure when your heart relaxes and fills back up with blood. Ask your healthcare provider what your BP should be. For most people, a good BP goal is less than 140/90.       DISCHARGE INSTRUCTIONS:   Call your doctor if:   Your BP is higher or lower than you were told it should be.    You have questions  or concerns about your condition or care.    Why you may need to take BP readings:  You may not have any signs or symptoms of high BP. You may need to take BP readings regularly to know how often your BP is high. High BP increases your risk for a stroke, heart attack, or kidney disease. You may need to take medicine to keep your BP at a normal level. Write down and keep a log of your BP readings. Your healthcare provider can use the results to see if your BP medicines are working.  How often to take your BP readings:  Your healthcare provider may recommend that you take your BP readings 2 times a day. Take the readings at the same times each day, such as the morning and evening.  How to take BP readings:  You can take BP readings at home with a digital BP monitor. Read the instructions that came with your BP monitor. The monitor comes with an adjustable cuff. Ask your healthcare provider if your cuff is the correct size.  Do not eat, drink, smoke, or exercise for 30 minutes before you take BP readings.    Rest quietly for 5 minutes before you begin. Do not talk while you take BP readings.    Sit with your feet flat on the floor and your back against a chair.    Put your arm straight out and support it on a flat surface. Your arm should be at chest level. Do not move your arm while you take your BP readings.    Make sure all of the air is out of the cuff. Place the BP cuff against your bare skin about 1 inch (2.5 cm) above your elbow. Wrap the cuff snugly around your arm. The BP reading may not be correct if the cuff is too loose.    If you are using a wrist cuff, wrap the cuff snugly around your wrist. Hold your wrist at the same level as your heart.    Turn on the BP monitor and follow the directions.    Write down your BP, the date, the time, and which arm you used to take BP reading. Take 2 BP readings and write down both results. Use the same arm each time. These BP readings can be 1 minute apart.       What  else you need to know:   Do not take a BP reading in an arm that is injured or has an IV or shunt.  The reading may not be accurate.    Do not stop taking your medicines if your BP is at your goal.  A BP at your goal means your medicine is working correctly. Take your BP medicines as directed.    Follow up with your doctor as directed:  Bring the log of your BP readings. Also bring the BP machine. Healthcare providers can check that you are using the machine correctly. Write down your questions so you remember to ask them during your visits.  © Copyright [employer] 2023 Information is for End User's use only and may not be sold, redistributed or otherwise used for commercial purposes.  The above information is an  only. It is not intended as medical advice for individual conditions or treatments. Talk to your doctor, nurse or pharmacist before following any medical regimen to see if it is safe and effective for you.

## 2024-02-06 ENCOUNTER — TELEPHONE (OUTPATIENT)
Dept: FAMILY MEDICINE CLINIC | Facility: CLINIC | Age: 44
End: 2024-02-06

## 2024-02-14 ENCOUNTER — OFFICE VISIT (OUTPATIENT)
Dept: FAMILY MEDICINE CLINIC | Facility: CLINIC | Age: 44
End: 2024-02-14
Payer: COMMERCIAL

## 2024-02-14 VITALS
OXYGEN SATURATION: 97 % | DIASTOLIC BLOOD PRESSURE: 89 MMHG | TEMPERATURE: 97.5 F | BODY MASS INDEX: 32.39 KG/M2 | HEIGHT: 73 IN | SYSTOLIC BLOOD PRESSURE: 153 MMHG | HEART RATE: 108 BPM | WEIGHT: 244.4 LBS | RESPIRATION RATE: 16 BRPM

## 2024-02-14 DIAGNOSIS — R68.89 FLU-LIKE SYMPTOMS: Primary | ICD-10-CM

## 2024-02-14 PROCEDURE — 99213 OFFICE O/P EST LOW 20 MIN: CPT | Performed by: NURSE PRACTITIONER

## 2024-02-14 PROCEDURE — 87636 SARSCOV2 & INF A&B AMP PRB: CPT | Performed by: NURSE PRACTITIONER

## 2024-02-14 RX ORDER — ALBUTEROL SULFATE 90 UG/1
1 AEROSOL, METERED RESPIRATORY (INHALATION) EVERY 6 HOURS PRN
Qty: 18 G | Refills: 5 | Status: SHIPPED | OUTPATIENT
Start: 2024-02-14

## 2024-02-14 NOTE — ASSESSMENT & PLAN NOTE
Sudden onset yesterday.  Swab sent for covid/flu.  Advised otc medication including acetaminophen, ibuprofen prn while we await results.  Will follow up pending, pt aware symptoms seem viral in nature.  Sent prescription for albuterol in case his is .  Continue prn.

## 2024-02-14 NOTE — PROGRESS NOTES
Name: Frances Lowery      : 1980      MRN: [id number]  Encounter Provider: QUANG Pat  Encounter Date: 2024   Encounter department: [facility]    Assessment & Plan     1. Flu-like symptoms  Assessment & Plan:  Sudden onset yesterday.  Swab sent for covid/flu.  Advised otc medication including acetaminophen, ibuprofen prn while we await results.  Will follow up pending, pt aware symptoms seem viral in nature.  Sent prescription for albuterol in case his is .  Continue prn.    Orders:  -     Covid/Flu- Office Collect Normal  -     albuterol (Ventolin HFA) 90 mcg/act inhaler; Inhale 1 puff every 6 (six) hours as needed for wheezing or shortness of breath           Subjective      Pt is a 43 y.o. y/o male who is seen today for evaluation of cold symptoms.  He started with cold symptoms yesterday including cough, body aches, chills, fatigue, sinus congestion/pressure, sore throat.  He denies sob, fever.  Appetite is fine, denies n/v/d.  He is not taking anything for his symptoms other than lozenges.  He feels he is hydrating fine.  He has an albuterol inhaler at home from a prior infection that he uses as needed but does not know if it is still good.      Review of Systems   Constitutional:  Positive for chills and fatigue. Negative for appetite change and fever.   HENT:  Positive for rhinorrhea, sinus pressure, sneezing and sore throat. Negative for congestion, ear pain, hearing loss, sinus pain and trouble swallowing.    Respiratory:  Positive for cough. Negative for chest tightness, shortness of breath and wheezing.    Gastrointestinal:  Negative for diarrhea, nausea and vomiting.   Musculoskeletal:  Positive for myalgias.   Neurological:  Negative for dizziness, light-headedness and headaches.   Hematological:  Negative for adenopathy.       Current Outpatient Medications on File Prior to Visit   Medication Sig    acetaminophen (TYLENOL) 500 mg tablet Take 2  "tablets (1,000 mg total) by mouth every 8 (eight) hours as needed for mild pain (Patient not taking: Reported on 2/14/2024)    cyclobenzaprine (FLEXERIL) 10 mg tablet Take 1 tablet (10 mg total) by mouth 3 (three) times a day as needed for muscle spasms (Patient not taking: Reported on 2/14/2024)    naproxen (Naprosyn) 500 mg tablet Take 1 tablet (500 mg total) by mouth 2 (two) times a day with meals (Patient not taking: Reported on 2/14/2024)       Objective     /89 (BP Location: Right arm)   Pulse (!) 108   Temp 97.5 °F (36.4 °C) (Temporal)   Resp 16   Ht 6' 1\" (1.854 m)   Wt 111 kg (244 lb 6.4 oz)   SpO2 97%   BMI 32.24 kg/m²     Physical Exam  Vitals reviewed.   Constitutional:       General: He is awake. He is not in acute distress.     Appearance: Normal appearance. He is well-developed and well-groomed. He is ill-appearing.   HENT:      Head: Normocephalic.      Right Ear: Hearing, ear canal and external ear normal. No middle ear effusion. Tympanic membrane is bulging.      Left Ear: Hearing, ear canal and external ear normal.  No middle ear effusion. Tympanic membrane is bulging.      Nose: Mucosal edema and congestion present.      Mouth/Throat:      Mouth: Mucous membranes are not dry.      Pharynx: Oropharynx is clear. No oropharyngeal exudate or posterior oropharyngeal erythema.      Tonsils: No tonsillar abscesses.   Eyes:      Conjunctiva/sclera: Conjunctivae normal.   Cardiovascular:      Rate and Rhythm: Regular rhythm. Tachycardia present.      Heart sounds: Normal heart sounds.   Pulmonary:      Effort: Pulmonary effort is normal. Tachypnea present.      Breath sounds: Examination of the right-middle field reveals wheezing. Examination of the right-lower field reveals wheezing. Wheezing present. No rhonchi or rales.   Lymphadenopathy:      Head:      Right side of head: No submental, submandibular, tonsillar, preauricular, posterior auricular or occipital adenopathy.      Left side of " head: No submental, submandibular, tonsillar, preauricular, posterior auricular or occipital adenopathy.      Cervical: No cervical adenopathy.   Skin:     General: Skin is warm and dry.   Neurological:      Mental Status: He is alert and oriented to person, place, and time.   Psychiatric:         Attention and Perception: Attention normal.         Mood and Affect: Mood normal.         Speech: Speech normal.         Behavior: Behavior normal. Behavior is cooperative.         Thought Content: Thought content normal.         Cognition and Memory: Cognition normal.         Judgment: Judgment normal.       QUANG Pat

## 2024-02-15 DIAGNOSIS — J10.1 INFLUENZA A: Primary | ICD-10-CM

## 2024-02-15 LAB
FLUAV RNA RESP QL NAA+PROBE: POSITIVE
FLUBV RNA RESP QL NAA+PROBE: NEGATIVE
SARS-COV-2 RNA RESP QL NAA+PROBE: NEGATIVE

## 2024-02-15 RX ORDER — OSELTAMIVIR PHOSPHATE 75 MG/1
75 CAPSULE ORAL EVERY 12 HOURS SCHEDULED
Qty: 10 CAPSULE | Refills: 0 | Status: SHIPPED | OUTPATIENT
Start: 2024-02-15 | End: 2024-02-20

## 2024-02-26 ENCOUNTER — TELEPHONE (OUTPATIENT)
Age: 44
End: 2024-02-26

## 2024-02-26 NOTE — TELEPHONE ENCOUNTER
FYI: Pt called to confirm appt and ask is there a copay since PCP requested appt. Pt was told there is a copay for appt.       Pt stated he feels better and requested to cancel appt on 2/27/24

## 2024-09-25 ENCOUNTER — OFFICE VISIT (OUTPATIENT)
Dept: FAMILY MEDICINE CLINIC | Facility: CLINIC | Age: 44
End: 2024-09-25
Payer: COMMERCIAL

## 2024-09-25 VITALS
TEMPERATURE: 98.6 F | WEIGHT: 236 LBS | HEART RATE: 89 BPM | SYSTOLIC BLOOD PRESSURE: 150 MMHG | OXYGEN SATURATION: 98 % | BODY MASS INDEX: 31.28 KG/M2 | HEIGHT: 73 IN | RESPIRATION RATE: 17 BRPM | DIASTOLIC BLOOD PRESSURE: 100 MMHG

## 2024-09-25 DIAGNOSIS — K08.9 POOR DENTITION: ICD-10-CM

## 2024-09-25 DIAGNOSIS — Z11.3 SCREEN FOR STD (SEXUALLY TRANSMITTED DISEASE): ICD-10-CM

## 2024-09-25 DIAGNOSIS — K06.8 GUM LESION: ICD-10-CM

## 2024-09-25 DIAGNOSIS — L60.0 INGROWN NAIL: ICD-10-CM

## 2024-09-25 DIAGNOSIS — I10 PRIMARY HYPERTENSION: Primary | ICD-10-CM

## 2024-09-25 DIAGNOSIS — L30.8 OTHER ECZEMA: ICD-10-CM

## 2024-09-25 PROBLEM — R68.89 FLU-LIKE SYMPTOMS: Status: RESOLVED | Noted: 2024-02-14 | Resolved: 2024-09-25

## 2024-09-25 PROBLEM — R68.89 FLU-LIKE SYMPTOMS: Status: RESOLVED | Noted: 2024-01-01 | Resolved: 2024-01-01

## 2024-09-25 PROCEDURE — 99214 OFFICE O/P EST MOD 30 MIN: CPT | Performed by: FAMILY MEDICINE

## 2024-09-25 RX ORDER — AMLODIPINE BESYLATE 5 MG/1
5 TABLET ORAL DAILY
Qty: 100 TABLET | Refills: 3 | Status: SHIPPED | OUTPATIENT
Start: 2024-09-25 | End: 2024-09-27

## 2024-09-25 RX ORDER — CEPHALEXIN 500 MG/1
500 CAPSULE ORAL 3 TIMES DAILY
Qty: 21 CAPSULE | Refills: 0 | Status: SHIPPED | OUTPATIENT
Start: 2024-09-25 | End: 2024-09-27

## 2024-09-25 RX ORDER — CHLORHEXIDINE GLUCONATE ORAL RINSE 1.2 MG/ML
15 SOLUTION DENTAL 2 TIMES DAILY
Qty: 120 ML | Refills: 0 | Status: SHIPPED | OUTPATIENT
Start: 2024-09-25

## 2024-09-25 RX ORDER — TRIAMCINOLONE ACETONIDE 5 MG/G
CREAM TOPICAL 3 TIMES DAILY
Qty: 15 G | Refills: 3 | Status: SHIPPED | OUTPATIENT
Start: 2024-09-25

## 2024-09-25 NOTE — ASSESSMENT & PLAN NOTE
Not controlled   Started him on Amlodipine   Orders:    Comprehensive metabolic panel    CBC and differential    Lipid panel    Hemoglobin A1C    amLODIPine (NORVASC) 5 mg tablet; Take 1 tablet (5 mg total) by mouth daily

## 2024-09-27 PROCEDURE — 88312 SPECIAL STAINS GROUP 1: CPT | Performed by: PATHOLOGY

## 2024-09-27 PROCEDURE — 88305 TISSUE EXAM BY PATHOLOGIST: CPT | Performed by: PATHOLOGY

## 2024-09-27 PROCEDURE — 88342 IMHCHEM/IMCYTCHM 1ST ANTB: CPT | Performed by: PATHOLOGY

## 2024-09-27 PROCEDURE — 88341 IMHCHEM/IMCYTCHM EA ADD ANTB: CPT | Performed by: PATHOLOGY

## 2024-09-27 PROCEDURE — 88365 INSITU HYBRIDIZATION (FISH): CPT | Performed by: PATHOLOGY

## 2024-10-07 ENCOUNTER — APPOINTMENT (OUTPATIENT)
Dept: LAB | Facility: CLINIC | Age: 44
End: 2024-10-07
Payer: COMMERCIAL

## 2024-10-07 DIAGNOSIS — Z11.3 SCREEN FOR STD (SEXUALLY TRANSMITTED DISEASE): ICD-10-CM

## 2024-10-07 DIAGNOSIS — D49.0: ICD-10-CM

## 2024-10-07 PROBLEM — I62.9 INTRACRANIAL HEMORRHAGE (HCC): Status: ACTIVE | Noted: 2024-01-01

## 2024-10-07 LAB
ALBUMIN SERPL BCG-MCNC: 4 G/DL (ref 3.5–5)
ALP SERPL-CCNC: 79 U/L (ref 34–104)
ALT SERPL W P-5'-P-CCNC: 26 U/L (ref 7–52)
ANION GAP SERPL CALCULATED.3IONS-SCNC: 8 MMOL/L (ref 4–13)
AST SERPL W P-5'-P-CCNC: 32 U/L (ref 13–39)
BILIRUB SERPL-MCNC: 1.32 MG/DL (ref 0.2–1)
BUN SERPL-MCNC: 14 MG/DL (ref 5–25)
CALCIUM SERPL-MCNC: 9 MG/DL (ref 8.4–10.2)
CHLORIDE SERPL-SCNC: 102 MMOL/L (ref 96–108)
CHOLEST SERPL-MCNC: 122 MG/DL
CO2 SERPL-SCNC: 28 MMOL/L (ref 21–32)
CREAT SERPL-MCNC: 1.21 MG/DL (ref 0.6–1.3)
ERYTHROCYTE [DISTWIDTH] IN BLOOD BY AUTOMATED COUNT: 18.2 % (ref 11.6–15.1)
EST. AVERAGE GLUCOSE BLD GHB EST-MCNC: 105 MG/DL
GFR SERPL CREATININE-BSD FRML MDRD: 72 ML/MIN/1.73SQ M
GLUCOSE P FAST SERPL-MCNC: 132 MG/DL (ref 65–99)
HBA1C MFR BLD: 5.3 %
HCT VFR BLD AUTO: 37.5 % (ref 36.5–49.3)
HCV AB SER QL: NORMAL
HDLC SERPL-MCNC: 27 MG/DL
HGB BLD-MCNC: 11.5 G/DL (ref 12–17)
LDLC SERPL CALC-MCNC: 65 MG/DL (ref 0–100)
MCH RBC QN AUTO: 30.3 PG (ref 26.8–34.3)
MCHC RBC AUTO-ENTMCNC: 30.7 G/DL (ref 31.4–37.4)
MCV RBC AUTO: 99 FL (ref 82–98)
NONHDLC SERPL-MCNC: 95 MG/DL
PLATELET # BLD AUTO: 5 THOUSANDS/UL (ref 149–390)
POTASSIUM SERPL-SCNC: 3.8 MMOL/L (ref 3.5–5.3)
PROT SERPL-MCNC: 7.4 G/DL (ref 6.4–8.4)
RBC # BLD AUTO: 3.8 MILLION/UL (ref 3.88–5.62)
SODIUM SERPL-SCNC: 138 MMOL/L (ref 135–147)
TRIGL SERPL-MCNC: 152 MG/DL
WBC # BLD AUTO: 66.21 THOUSAND/UL (ref 4.31–10.16)

## 2024-10-07 PROCEDURE — 86803 HEPATITIS C AB TEST: CPT

## 2024-10-07 PROCEDURE — 87389 HIV-1 AG W/HIV-1&-2 AB AG IA: CPT

## 2024-10-07 NOTE — PROGRESS NOTES
Pastoral Care Progress Note     10/07/24 1800   Clinical Encounter Type   Visited With Patient   Crisis Visit Trauma   Voodoo Encounters   Voodoo Needs Prayer     Trauma A Fall from Seizure, 43yo male brought in by [facility].  No family in contacts or on scene.  I remained with patient for ministry of presence in Trauma [place] (where I said silent prayers) and CT scan, until patient transferred to ICU 9.  Medical staff insisted they will call home phone to reach family when time.  Asked ED reception to inform me if family arrives.   services remain available.  WENDY, 10/7/24 @[id number].

## 2024-10-07 NOTE — PROGRESS NOTES
Pastoral Care Progress Note       10/07/24 1900   Clinical Encounter Type   Visited With Family   Routine Visit Follow-up   Nondenominational Encounters   Nondenominational Needs Prayer   Sacramental Encounters   Sacrament of Sick-Anointing Family requested anointing  (Family requesting Last Rites)     Facilitated bringing family (mother, father, and sister) to medical briefing.  Family informed of critical condition of patient in family ICU briefing room.  Provided prayer and hospitality.   Will refer to [employer]  for Last Rites.  Will brief overnight  for ministry of presence in grieving process.  Will remain with family until overnight  relieves me.   services remain available.  WENDY, 10/7/24 @193[facility].

## 2024-10-07 NOTE — ED PROVIDER NOTES
Emergency Department Airway Evaluation and Management Form    History  Obtained from: ems  Review of patient's allergies indicates no known allergies.    Chief Complaint:  Trauma Alert    HPI: Pt is a 44 y.o. male presents s/p fall, seizure, posturing, unresponsive, no hx of seizure or blood thinners.      I have reviewed and agree with the history as documented.      Physical Exam    Vitals:    10/07/24 1755   BP: (!) 180/86   Pulse: (!) 48   Resp: 12   Temp:    SpO2: 100%     Supplemental Oxygen: intubated after bagging    GCS: 3    Neuro: unresponsive to voice, pain  Psych: not combative, not anxious, cooperative for exam  Neck: In collar, No JVD, No midline tenderness  Cardio:  Normal  Respiratory: Normal  Mouth:  Normal  Pharynx: Normal    Monitor:  NSR      ED Medications      Current Facility-Administered Medications:     EPINEPHrine PF (ADRENALIN) 1 mg/mL injection **ADS Override Pull**, , , ,     EPINEPHrine PF (ADRENALIN) 1 mg/mL injection **ADS Override Pull**, , , ,   No current outpatient medications on file.      Intubation    Endotracheal Intubation Procedure Note  Indication for endotracheal intubation: airway compromise  Sedation: etomidate  Paralytic: succinylcholine  Lidocaine: no  Atropine: no  Equipment:  glidescope  Cricoid Pressure: no  Number of attempts: 1  ETT location confirmed by auscultation, cxr, etco2 color change  No adverse events including bradycardia, hypotension, desaturation, aspiration, or esophageal intubation     Final Diagnosis:  Traumatic brain injury, seizure, acute respiratory failure, herniation    ED Provider  Electronically Signed by       Dolly Morales MD  10/07/24 1446

## 2024-10-07 NOTE — ASSESSMENT & PLAN NOTE
Spontaneous intracranial hemorrhage, likely hemorrhagic.  ICH score 4.  Overall poor prognosis given neurological examination and rapid decline.  Discussed with patient's family including mother father and sister along with trauma attending and neurocritical care attending.  CT head reviewed.  Explained that this may not be a survivable hemorrhage.  Would likely be left with permanent neurological deficit if he were to survive including hemiplegia.  Patient's family indicated that he would not be interested surviving with any form of neurological deficit.  We did discuss placement of a right frontal EVD including risk, benefits and alternatives, though this would be a lifesaving as opposed to function sparing procedure.  Once again they reiterated that he would not be interested in any aggressive intervention for him.  Further goals of care discussion with trauma neurocritical care.  Neurosurgery will sign off.

## 2024-10-07 NOTE — CONSULTS
Consultation - Neurosurgery   Name: Saroj Gómez 44 y.o. male I MRN: [id number]  Unit/Bed#: ICU 09 I Date of Admission: 10/7/2024   Date of Service: 10/7/2024 I Hospital Day: 0   Consults  Physician Requesting Evaluation: Evan Lucas MD   Reason for Evaluation / Principal Problem: Intracranial hemorrhage    Assessment & Plan  Intracranial hemorrhage (HCC)  Spontaneous intracranial hemorrhage, likely hemorrhagic.  ICH score 4.  Overall poor prognosis given neurological examination and rapid decline.  Discussed with patient's family including mother father and sister along with trauma attending and neurocritical care attending.  CT head reviewed.  Explained that this may not be a survivable hemorrhage.  Would likely be left with permanent neurological deficit if he were to survive including hemiplegia.  Patient's family indicated that he would not be interested surviving with any form of neurological deficit.  We did discuss placement of a right frontal EVD including risk, benefits and alternatives, though this would be a lifesaving as opposed to function sparing procedure.  Once again they reiterated that he would not be interested in any aggressive intervention for him.  Further goals of care discussion with trauma neurocritical care.  Neurosurgery will sign off.      History of Present Illness   HPI: Saroj Gómez is a 44 y.o. year old male who presents with sudden onset loss of consciousness and seizure activity.  Brought to the emergency department with GCS of 3 with left pupil fixed and dilated and right pinpoint.    Review of Systems  Patient's prior history (PMH, PSH, SH, FH, etc) not obtainable due to Altered mental status    Objective :  Temp:  [99.2 °F (37.3 °C)-99.9 °F (37.7 °C)] 99.2 °F (37.3 °C)  HR:  [48-64] 64  BP: (180-215)/() 186/93  Resp:  [12-20] 20  SpO2:  [10 %-100 %] 100 %  O2 Device: Ventilator    Physical Exam  Vitals reviewed.   Skin:     General: Skin is warm and dry.       Comments: Right knee abrasion.   Neurological:      Comments: No recent sedation.  Intubated.  No response to voice or pain.  Pupils fixed and dilated at 7 mm.  Corneal negative bilaterally.  No gag.  Positive cough.      Neurological Exam  Mental Status  Unresponsive to painful stimuli.  No recent sedation.  Intubated.  No response to voice or pain.  Pupils fixed and dilated at 7 mm.  Corneal negative bilaterally.  No gag.  Positive cough..        Lab Results: I have reviewed the following results:  Recent Labs     10/07/24  1823   SODIUM 136   K 3.6      CO2 20*   BUN 16   CREATININE 1.21   GLUC 260*   MG 2.0   PHOS 4.0   AST 40*   ALT 26   ALB 3.8   TBILI 1.38*   ALKPHOS 85   PTT 25   INR 1.24*   LACTICACID 3.9*       Imaging Results Review: I personally reviewed the following image studies in PACS and associated radiology reports: CTA and CT head. My interpretation of the radiology images/reports is: Left basal ganglia hemorrhage extending into the left frontal lobe with large intraventricular hemorrhage and early hydrocephalus.  Brainstem appears hypodense.  Small amount of subarachnoid hemorrhage.  No obvious underlying vascular lesions on CTA of the head and neck.  Formal report is pending.    Other Study Results Review: No additional pertinent studies reviewed.    VTE Pharmacologic Prophylaxis: Sequential compression device (Venodyne)     Administrative Statements   I have spent a total time of 25 minutes in caring for this patient on the day of the visit/encounter including Diagnostic results, Prognosis, Impressions, Documenting in the medical record, Reviewing / ordering tests, medicine, procedures  , and Communicating with other healthcare professionals .

## 2024-10-07 NOTE — RESPIRATORY THERAPY NOTE
Resp care   10/07/24 7516   Respiratory Assessment   Assessment Type Assess only   General Appearance Unresponsive;Sedated   Respiratory Pattern Assisted   Chest Assessment Chest expansion symmetrical   Bilateral Breath Sounds Clear   Resp Comments received pt from EMS being ventilated via ambu bag and face mask. Pt intubated with 8.0 oett by ER doctor.  oett located at 24cm at lip . positive color change on easy cap, B/S equal and bilat. Placement confirmed on CT scan. Pt placed on transport and transferred to ICU bed 9 via ct scan. Transport vent settings were A/C 450 vt, rr 20, 100% fio2 and peep of 6. Pt placed on CMV upon arrival to ICU with settings per flow sheet.   Vent Information   Vent type Hector C3   [place] Vent Mode (S)CMV   $ Vent Charge-INITIAL Yes   Ventilator Start Yes   $ Vital Capacity Mech/Peak Flow Yes   $ Pulse Oximetry Spot Check Charge Completed   SpO2 (!) 10 %   (S)CMV Settings   Resp Rate (BPM) 20 BPM   VT (mL) 450 mL   FIO2 (%) 100 %   PEEP (cmH2O) 6 cmH2O   I:E Ratio 1:2   Insp Time (%) 1 %   Flow Trigger (LPM) 3   Humidification Heater   Heater Temperature (Set) 95 °F (35 °C)   (S)CMV Actuals   Resp Rate (BPM) 20 BPM   VT (mL) 498   MV 9.9   MAP (cmH2O) 9.4 cmH2O   Peak Pressure (cmH2O) 20 cmH2O   I:E Ratio (Obs) 1:2   Insp Resistance 6   Heater Temperature (Obs) 96.8 °F (36 °C)   (S)CMV Alarms   High Peak Pressure (cmH2O) 40   Low Pressure (cmH2O) 8 cm H2O   High Resp Rate (BPM) 40 BPM   Low Resp Rate (BPM) 5 BPM   High MV (L/min) 20 L/min   Low MV (L/min) 4 L/min   High VT (mL) 1000 mL   Low VT (mL) 250 mL   Apnea Time (s) 20 S   Maintenance   Alarm (pink) cable attached No   Resuscitation bag with peep valve at bedside Yes   Water bag changed Yes   Circuit changed Yes   Daily Screen   Patient safety screen outcome: Failed   Not Ready for Weaning due to: Underline problem not resolved   ETT  Cuffed 8 mm   Placement Date/Time: 10/07/24 c) 0677   Type: Cuffed  Tube Size: 8 mm   Location: Oral  Insertion attempts: 1  Placement Verification: Chest x-ray;Symmetrical chest wall movement  Secured at (cm): 25   Secured at (cm) 24   Measured from Lips   Secured Location Center   Secured by Commercial tube philippe   Site Condition Dry   Cuff Pressure (color) Green   HI-LO Suction  Intermittent suction

## 2024-10-07 NOTE — ED PROCEDURE NOTE
Procedure  Intubation    Date/Time: 10/7/2024 5:59 PM    Performed by: Gregory Ramírez DO  Authorized by: Gregory Ramírez DO    Patient location:  Other (comment) (trauma bay)  Other Assisting Provider: Yes (comment) (Dr. Morales)    Consent:     Consent obtained:  Emergent situation  Pre-procedure details:     Patient status:  Unresponsive    Pretreatment medications:  Etomidate    Paralytics:  Succinylcholine  Indications:     Indications for intubation: airway protection    Procedure details:     Preoxygenation:  Bag valve mask    CPR in progress: no      Intubation method:  Oral    Oral intubation technique:  Glidescope    Laryngoscope blade:  Mac 3    Tube size (mm):  8.0    Tube type:  Cuffed    Number of attempts:  1    Cricoid pressure: yes      Tube visualized through cords: yes    Placement assessment:     ETT to lip:  25    Tube secured with:  ETT philippe    Breath sounds:  Equal and absent over the epigastrium    Placement verification: chest rise, colorimetric ETCO2 device, CXR verification and equal breath sounds      CXR findings:  ETT in proper place  Post-procedure details:     Patient tolerance of procedure:  Tolerated well, no immediate complications                   Gregory Ramírez DO  10/07/24 1806

## 2024-10-07 NOTE — PROCEDURES
Arterial Line Insertion    Date/Time: 10/7/2024 7:30 PM    Performed by: SUZAN Berg  Authorized by: SUZAN Berg    Consent:     Consent obtained:  Emergent situation  Indications:     Indications: continuous blood pressure monitoring    Pre-procedure details:     Skin preparation:  Chlorhexidine    Preparation: Patient was prepped and draped in sterile fashion    Anesthesia (see MAR for exact dosages):     Anesthesia method:  None  Procedure details:     Location / Tip of Catheter:  Radial    Laterality:  Right    Needle gauge:  20 G    Number of attempts:  1    Successful placement: yes    Post-procedure details:     Post-procedure:  Secured with tape, sterile dressing applied and sutured    CMS:  Normal    Patient tolerance of procedure:  Tolerated well, no immediate complications

## 2024-10-08 PROBLEM — K92.0 HEMATEMESIS: Status: ACTIVE | Noted: 2024-01-01

## 2024-10-08 PROBLEM — R31.9 HEMATURIA: Status: ACTIVE | Noted: 2024-01-01

## 2024-10-08 PROBLEM — R73.9 HYPERGLYCEMIA: Status: ACTIVE | Noted: 2024-01-01

## 2024-10-08 LAB
ANISOCYTOSIS BLD QL SMEAR: PRESENT
BASO STIPL BLD QL SMEAR: PRESENT
BASOPHILS # BLD MANUAL: 0 THOUSAND/UL (ref 0–0.1)
BASOPHILS NFR MAR MANUAL: 0 % (ref 0–1)
BLASTS ABSOLUTE COUNT: 28.47 THOUSAND/UL (ref 0–0)
BLASTS NFR BLD MANUAL: 43 %
DIFFERENTIAL COMMENT: ABNORMAL
EOSINOPHIL # BLD MANUAL: 1.32 THOUSAND/UL (ref 0–0.4)
EOSINOPHIL NFR BLD MANUAL: 2 % (ref 0–6)
HIV 1+2 AB+HIV1 P24 AG SERPL QL IA: NORMAL
HIV 2 AB SERPL QL IA: NORMAL
HIV1 AB SERPL QL IA: NORMAL
HIV1 P24 AG SERPL QL IA: NORMAL
LYMPHOCYTES # BLD AUTO: 11 % (ref 14–44)
LYMPHOCYTES # BLD AUTO: 7.28 THOUSAND/UL (ref 0.6–4.47)
MONOCYTES # BLD AUTO: 21.19 THOUSAND/UL (ref 0–1.22)
MONOCYTES NFR BLD: 32 % (ref 4–12)
MYELOCYTE ABSOLUTE CT: 1.99 THOUSAND/UL (ref 0–0.1)
MYELOCYTES NFR BLD MANUAL: 3 % (ref 0–1)
NEUTROPHILS # BLD MANUAL: 5.96 THOUSAND/UL (ref 1.85–7.62)
NEUTS SEG NFR BLD AUTO: 9 % (ref 43–75)
NRBC BLD AUTO-RTO: 5 /100 WBC (ref 0–2)
PATHOLOGY REVIEW: YES
PLATELET BLD QL SMEAR: ABNORMAL
POLYCHROMASIA BLD QL SMEAR: PRESENT
RBC MORPH BLD: PRESENT
SMUDGE CELLS BLD QL SMEAR: PRESENT

## 2024-10-08 NOTE — ASSESSMENT & PLAN NOTE
- CT Head: Acute parenchymal hematoma centered in the left basal ganglia with extension to the left frontal lobe and ventricles with mild edema and mass effect with 6 mm of left to right shift and effacement of the basal cisterns.  Mild hydrocephalus   - PT bradycardic and hypertensive with blown left pupil  - Head of bed was elevated at 30 degrees  - Fentanyl 50 mg given   - loading dose of keppra 1,000 mg given

## 2024-10-08 NOTE — PROGRESS NOTES
Spiritual Care Progress Note       debriefed on situation at shift change. [facility] Team currently is speaking with family at bedside -  more family to arrive this evening. Pt's name has been put on Fr. Huber's visitation list for first thing in the morning per family request.  will follow-up when appropriate and remains available as needed.       10/07/24 2000   Clinical Encounter Type   Visited With Health care provider;Family

## 2024-10-08 NOTE — TRAUMA DOCUMENTATION
ICU called and notified pt coming to the floor and report would be at the bedside.  Trauma team notified they must put in bed request.

## 2024-10-08 NOTE — CONSULTS
Consultation - Critical Care/ICU   Name: Saroj Gómez 44 y.o. male I MRN: [id number]  Unit/Bed#: ICU 09 I Date of Admission: 10/7/2024   Date of Service: 10/7/24I Hospital Day: 1   Consults  Physician Requesting Evaluation: Evan Lucas MD   Reason for Evaluation / Principal Problem: ICH        Assessment & Plan   Neuro:   Diagnosis: ICH  Plan: 10/7 CTH-intercerebral hemorrhage left basal ganglia with extension into the left frontal lobe and ventricles, mild edema and mass effect with 6 mm left-to-right midline shift, effacement of the basal cisterns, mild hydrocephalus  ICH score 4  Every hour neurochecks  Neurosurgery consulted  Q1h neuro checks   Diagnosis: Seizures  Plan: Given 3 g Keppra load  Continue Keppra 1.5 g twice daily  Maintain seizure precautions  Given 4 mg Ativan for lower extremity rhythmic movement and tachycardia  Diagnosis: Pain, sedation  Plan: Propofol drip  As needed fentanyl    CV:   Diagnosis: Hypertension  Plan: maintain SBP < 140  Cardene drip    Pulm:  Diagnosis: Respiratory insufficiency  Plan: Intubated for airway protection secondary to mental status  Maintain vent    GI:   Diagnosis: Hematemesis  Plan: Protonix twice daily  Maintain NG tube to suction    :   Diagnosis: Lactic acidosis, hematuria  Plan: Lactic on admission 3.9, improved with fluid resuscitation  Maintain Robledo  Follow-up a.m. hemoglobin    F/E/N:   Replete electrolytes as needed  NPO    Heme/Onc:   Diagnosis: Thrombocytopenia  Plan: Platelets on admission 7  Hold off on transfusion given family wish for no escalation of care  10/7 HIV nonreactive  Follow-up peripheral smear  Follow-up differential    Endo:   Diagnosis: Hyperglycemia  Plan: Insulin drip  Follow-up a.m. hemoglobin A1c    ID:   Diagnosis: Leukocytosis  Plan: WBC on admission 95.14  Follow-up peripheral smear    MSK/Skin:   Diagnosis: Risk for pressure injury, ambulatory dysfunction  Plan: PT/OT when able  Frequent turns and  repositioning    Disposition: Critical care    History of Present Illness   Saroj Gómez is a 44 y.o. with a past medical history of hypertension. Patient has been experiencing gum lesions and chills for 1 month. Patient became more confused, altered mental status fell to ground, concern for seizures. Presented to [facility] trauma bay, GCS 3, intubated for airway protection. CT imaging completed showing intercerebral hemorrhage, transferred to neurocritical care. Given poor prognosis, family made decision to not pursue neurosurgical intervention. Initial labs significant for platelets 7, WBC 95.     History obtained from chart review and unobtainable from patient due to mental status.      Historical Information   No past medical history on file. No past surgical history on file.   No current outpatient medications Not on File     No family history on file.       Objective :                   Vitals I/O      Most Recent Min/Max in 24hrs   Temp 99.4 °F (37.4 °C) Temp  Min: 99.2 °F (37.3 °C)  Max: 99.9 °F (37.7 °C)   Pulse 94 Pulse  Min: 48  Max: 140   Resp 18 Resp  Min: 12  Max: 20   /73 BP  Min: 103/58  Max: 215/107   O2 Sat 100 % SpO2  Min: 10 %  Max: 100 %      Intake/Output Summary (Last 24 hours) at 10/8/2024 0046  Last data filed at 10/8/2024 0000  Gross per 24 hour   Intake 1536.08 ml   Output 3300 ml   Net -1763.92 ml       Diet NPO    Invasive Monitoring           Physical Exam   Physical Exam  Vitals and nursing note reviewed.   Eyes:      Comments: 6 b/l non-reactive    Skin:     General: Skin is warm.      Capillary Refill: Capillary refill takes less than 2 seconds.      Findings: Wound (Multiple abrasions, chest wall hematoma) present.   HENT:      Mouth/Throat:      Mouth: Mucous membranes are dry.   Cardiovascular:      Rate and Rhythm: Tachycardia present.      Pulses: Normal pulses.   Abdominal:      Palpations: Abdomen is soft.   Constitutional:       Appearance: He is ill-appearing.   Pulmonary:       Breath sounds: Normal breath sounds.   Neurological:      Comments: Does not open eyes  Cough and gag intact  No response to stimulation all extremities   Genitourinary/Anorectal:     Comments: Hematuria  Robledo present.        Diagnostic Studies        Lab Results: I have reviewed the following results:     Medications:  Scheduled PRN   chlorhexidine, 15 mL, Q12H PETER  EPINEPHrine, ,   EPINEPHrine PF, ,   EPINEPHrine PF, ,   levETIRAcetam, 1,500 mg, Q12H PETER  mannitol, ,   mannitol, ,   mannitol, ,   mannitol, 50 g, Once  niCARdipine, ,   norepinephrine, ,   pantoprazole, 40 mg, Q12H PETER      EPINEPHrine, ,   EPINEPHrine PF, ,   EPINEPHrine PF, ,   fentaNYL, 50 mcg, Q1H PRN  hydrALAZINE, 10 mg, Q4H PRN  mannitol, ,   mannitol, ,   mannitol, ,   niCARdipine, ,   norepinephrine, ,        Continuous    insulin regular (HumuLIN R,NovoLIN R) 1 Units/mL in sodium chloride 0.9 % 100 mL infusion, 0.3-21 Units/hr, Last Rate: 3 Units/hr (10/07/24 2331)  niCARdipine, 1-15 mg/hr, Last Rate: Stopped (10/07/24 2033)  norepinephrine, 1-30 mcg/min, Last Rate: 16 mcg/min (10/08/24 0037)  propofol, 5-50 mcg/kg/min, Last Rate: Stopped (10/07/24 2120)  vasopressin, 0.04 Units/min, Last Rate: 0.04 Units/min (10/07/24 2152)         Labs:   CBC    Recent Labs     10/07/24  2024 10/07/24  2245   WBC 97.58* 75.30*   HGB 11.9* 10.3*   HCT 37.1 32.7*   PLT 8* 8*     BMP    Recent Labs     10/07/24  2024 10/07/24  2147   SODIUM 138 140   K 3.7 4.1    106   CO2 23 22   AGAP 14* 12   BUN 16 16   CREATININE 1.19 1.28   CALCIUM 8.6 7.9*       Coags    Recent Labs     10/07/24  1823   INR 1.24*   PTT 25        Additional Electrolytes  Recent Labs     10/07/24  1823   MG 2.0   PHOS 4.0          Blood Gas    Recent Labs     10/07/24  1922   PHART 7.331*   MDS8LMD 45.5*   PO2ART 92.5   NHU8PDS 23.5   BEART -2.5   SOURCE Line, Arterial     Recent Labs     10/07/24  1922   SOURCE Line, Arterial    LFTs  Recent Labs     10/07/24  1823   ALT 26    AST 40*   ALKPHOS 85   ALB 3.8   TBILI 1.38*       Infectious  No recent results  Glucose  Recent Labs     10/07/24  1823 10/07/24  2024 10/07/24  2147   GLUC 260* 394* 349*

## 2024-10-08 NOTE — H&P
H&P - Critical Care/ICU   Name: Saroj Gómez 44 y.o. male I MRN: [id number]  Unit/Bed#: ICU 09 I Date of Admission: 10/7/2024   Date of Service: 10/7/2024 I Hospital Day: 0   { ?Quick Links I Problem List I PORCH I Billing Tip:06173}    Assessment & Plan   Neuro:   Diagnosis: ICH, altered mental status, seizures  Plan: Q1H Neuro checks  Pt received 3g Keppra load; maintain Keppra 1,500mg Q12H  MRI    CV:   Diagnosis: Hypertension  Cardene drip, currently infusing @ 15mg  PRN Hydralazine  Diagnosis: Tachycardia  Plan: EKG    Pulm:   Diagnosis: Acute Hypoxic Respiratory Failure  Plan: GCS in ED 3, Patient was intubated for airway protection  Vent set to A/C 20/450/6/100%  Goal to keep SaO2 > 92%  CXR on arrival was negative for any pulmonary disease    GI:   Diagnosis: NPO  Plan: OGT in place to LIS  Protonix    :   Diagnosis: Hematuria  Plan: CT pelvis showed pt likely passed a kidney stone recently  Maintain seaman  Monitor I&O    F/E/N:   Diagnosis: NPO  Plan: IVFs  Monitor electrolytes  Replete K+ < 4 and Mg < 2    Heme/Onc:   Diagnosis: Leukocytosis   Plan: WBC 97  Monitor for fever  Send blood cultures if spikes a fever  Diagnosis: Thrombocytopenia  Plan: Platelets 7  Corticosteroids  Transfuse platelets  Consult heme/onc  Diagnosis: Lactic Acidosis  Plan: IVFs  Recheck until < 2    Endo:   Diagnosis: Hyperglycemia  Plan: Insulin drip    ID:   Diagnosis: no issues      MSK/Skin:   Diagnosis: Altered mobility  Plan: frequent turns  SCDs  Diagnosis: R knee abrasion  Plan: dry dressing if draining     Disposition: Critical care    History of Present Illness   Saroj Gómez is a 44 y.o. with PMH of HTN on Amlodipine who presents s/p fall at home after sudden onset loss of consciousness, seizures, and posturing. Pt has no history of seizures. EMS was called by family and pt was brought to [facility] ED. His GCS on arrival was 3, his left pupil was fixed and dilated. And his right pupil was pinpoint. He was intubated. CT  head shows left basal ganglia hemorrhage extending into the left frontal lobe with large intraventricular hemorrhage and early hydrocephalus. Brainstem appears hypodense. Small amount of subarachnoid hemorrhage. No obvious underlying vascular lesions on CTA of the head and neck.   On arrival to ICU, bilateral pupils are fixed and dilated. Pt does not follow any commands. Pt withdraws to pain in all extremities. Pt does have a gag reflex when suctioned via ETT. Pt's WBC is 95 and Plts are 7. Pt became bradycardic and hypertensive at one point, and Mannitol was given. Pt is currently on a Propofol drip and a Cardene drip. Pt appeared to be seizing at one point with bilateral leg posturing, 4mg Ativan was given.   Family was updated on pt status and prognosis and state they don't believe they would be interested in any aggressive intervention but would like more time to discuss with other family.    History obtained from chart review and unobtainable from patient due to mental status.  Review of Systems: Review of Systems not obtainable due to Clinical Condition, Altered mental status    Historical Information   No past medical history on file. No past surgical history on file.   No current outpatient medications Not on File     No family history on file.       Objective :                   Vitals I/O      Most Recent Min/Max in 24hrs   Temp 99.2 °F (37.3 °C) Temp  Min: 99.2 °F (37.3 °C)  Max: 99.9 °F (37.7 °C)   Pulse 64 Pulse  Min: 48  Max: 64   Resp 20 Resp  Min: 12  Max: 20   BP (!) 195/99 BP  Min: 180/86  Max: 215/107   O2 Sat 100 % SpO2  Min: 10 %  Max: 100 %      Intake/Output Summary (Last 24 hours) at 10/7/2024 2100  Last data filed at 10/7/2024 1845  Gross per 24 hour   Intake --   Output 325 ml   Net -325 ml       Diet NPO    Invasive Monitoring   Arterial Line  Darleen BP 86/56  Arterial Line BP  Min: 86/56  Max: 154/70   MAP 66 mmHg  Arterial Line MAP (mmHg)  Min: 66 mmHg  Max: 92 mmHg           Physical  Exam   Physical Exam  Vitals and nursing note reviewed.   Eyes:      General: Visual field deficit present.      Extraocular Movements:      Right eye: Abnormal extraocular motion present.      Left eye: Abnormal extraocular motion present.   Skin:     General: Skin is warm and dry.      Capillary Refill: Capillary refill takes less than 2 seconds.   HENT:      Head: Normocephalic and atraumatic.      Mouth/Throat:      Mouth: Mucous membranes are moist.   Cardiovascular:      Rate and Rhythm: Regular rhythm. Tachycardia present.      Pulses: Normal pulses.      Heart sounds: Normal heart sounds.   Abdominal: General: Bowel sounds are normal.      Palpations: Abdomen is soft.   Constitutional:       General: He is in acute distress.      Appearance: He is well-developed and well-nourished. He is ill-appearing.      Interventions: He is sedated, intubated and restrained. Cervical collar in place.   Pulmonary:      Effort: Pulmonary effort is normal. He is intubated.      Breath sounds: Decreased breath sounds present.   Neurological:      Mental Status: He is lethargic, confused, disoriented to person, disoriented to place, disoriented to time and disoriented to situation.      GCS: GCS eye subscore is 1. GCS verbal subscore is 1. GCS motor subscore is 1.      Sensory: Sensory deficit present.      Motor: Weakness, seizure activity and motor deficit.        Corneal reflex absent, cough reflex and gag reflex intact.   Genitourinary/Anorectal:  Robledo present.        Diagnostic Studies    {IDisappearing Data Review I RadiologyI Cardiology:58121}  {?Quick Links I Lab Review I Micro Results I Radiology I Cardiology:61978}  Lab Results: I have reviewed the following results:     Medications:  Scheduled PRN   chlorhexidine, 15 mL, Q12H PETER  EPINEPHrine, ,   EPINEPHrine PF, ,   EPINEPHrine PF, ,   insulin lispro, 1-6 Units, Q6H PETER  [START ON 10/8/2024] levETIRAcetam, 1,500 mg, Q12H PETER  mannitol, ,   mannitol, ,   mannitol,  ,   mannitol, ,   niCARdipine, ,   norepinephrine, ,   sodium chloride, 1,000 mL, Once      EPINEPHrine, ,   EPINEPHrine PF, ,   EPINEPHrine PF, ,   fentaNYL, 50 mcg, Q1H PRN  hydrALAZINE, 10 mg, Q4H PRN  mannitol, ,   mannitol, ,   mannitol, ,   mannitol, ,   niCARdipine, ,   norepinephrine, ,        Continuous    niCARdipine, 1-15 mg/hr, Last Rate: Stopped (10/07/24 2033)  propofol, 5-50 mcg/kg/min, Last Rate: 50 mcg/kg/min (10/07/24 2025)         Labs: { I Disappearing Data Review I Results Review I Micro :[id number]}  CBC    Recent Labs     10/07/24  1823   WBC 95.14*   HGB 11.2*   HCT 36.3*   PLT 7*     BMP    Recent Labs     10/07/24  1823   SODIUM 136   K 3.6      CO2 20*   AGAP 15*   BUN 16   CREATININE 1.21   CALCIUM 8.5       Coags    Recent Labs     10/07/24  1823   INR 1.24*   PTT 25        Additional Electrolytes  Recent Labs     10/07/24  1823   MG 2.0   PHOS 4.0          Blood Gas    Recent Labs     10/07/24  1922   PHART 7.331*   FRA1FDV 45.5*   PO2ART 92.5   FNO7LCH 23.5   BEART -2.5   SOURCE Line, Arterial     Recent Labs     10/07/24  1922   SOURCE Line, Arterial    LFTs  Recent Labs     10/07/24  1823   ALT 26   AST 40*   ALKPHOS 85   ALB 3.8   TBILI 1.38*       Infectious  No recent results  Glucose  Recent Labs     10/07/24  1823   GLUC 260*

## 2024-10-08 NOTE — UTILIZATION REVIEW
Initial Clinical Review    Admission: Date/Time/Statement:   Admission Orders (From admission, onward)       Ordered        10/07/24 1810  Inpatient Admission  Once                          Orders Placed This Encounter   Procedures    Inpatient Admission     Standing Status:   Standing     Number of Occurrences:   1     Order Specific Question:   Level of Care     Answer:   Critical Care [15]     Order Specific Question:   Estimated length of stay     Answer:   More than 2 Midnights     Order Specific Question:   Certification     Answer:   I certify that inpatient services are medically necessary for this patient for a duration of greater than two midnights. See H&P and MD Progress Notes for additional information about the patient's course of treatment.     ED Arrival Information       Expected   -    Arrival   10/7/2024 17:44    Acuity   Immediate              Means of arrival   Ambulance    Escorted by   [facility]    Service   Critical Care/ICU    Admission type   Emergency              Arrival complaint   -             Chief Complaint   Patient presents with    Trauma     See trauma doc       Initial Presentation: 44 y.o. male presents to the ED via EMS from home as a Level A trauma with c/o witnessed fall/ collapse following what appeared to be a neurological prodromal episode within the preceding 60 minutes, followed by seizure activity x 10 minutes.  Became unresponsive, was intubated.  GCS 3 with blown L pupil. Small bruise occipital region, bilat knee contusions/abrasions. EMS gave narcan, IV Zofran,  PMH: unknown.  Labs - WBC 95.14, platelets 7, elevated anion gap, AST, T bili, glucose 260, elevated lactic acidosis 3.9, abnormal UA.  Imaging - no CP or pelvis disease.  No traumatic injuries, bibasilar consolidation, URL< RLL poss aspiration, R hydronephrosis poss recently passed stone or infection.  CT Brain - Acute parenchymal hematoma centered in the left basal ganglia with extension to the  left frontal lobe and ventricles with mild edema and mass effect with 6 mm of left to right shift and effacement of the basal cisterns. Mild hydrocephalus.  CTA  - active extravasation into L basal ganglia hematoma.  No acute CP disease.  Treated with IV Fentanyl x 2.  On exam overall prognosis is dismal.  Pt had central line and A line inserted.  HOB up to 20-30 degrees.  Treated with Cardene drip.  Pt was on 2 pressors.  Was seen by Neurocritiacl Care and Neurosurgery.  Discussion with family about poor prognosis and potential outcome if survives and they opted for no escalation of care at this time. This patient had a high probability of imminent or life-threatening deterioration due to devastating intracranial bleeding.    10/7 Critical Care Consult - Left basal ganglia ICH with intraventricular extension- ICH score-4, hydrocephalus, Encephalopathy, Cerebral edema/brain compression,   Acute respiratory failure- airway protection - critically ill in ICU.  Neuro imaging - large BG ICH with IV extension.  Comatose, had received hypertonic bolus, Mannitol, cardene drip, A line, remained on Vent, family decided no to pursue care, working toward organ optimization.       10/7 Neurosurgery Consult - Intracranial hemorrhage - spontaneous, likely hemorrhagic - ICH score 4.  Poor prognosis, rapid decline,nonsurvivable hemorrhage, family in agreement with no aggressive intervention.  On exam No response to voice or pain.  Pupils fixed and dilated at 7 mm.  Corneal negative bilaterally.  No gag.  Positive cough.     Date: 10/8   Day 2:   Pt was pronounced dead at 1143 today.        ED Treatment-Medication Administration from 10/07/2024 1740 to 10/07/2024 1815         Date/Time Order Dose Route Action     10/07/2024 1750 naloxone (FOR EMS ONLY) (NARCAN) 2 MG/2ML injection 2 mg 0 mg Does not apply Given to EMS     10/07/2024 1751 ondansetron (FOR EMS ONLY) (ZOFRAN) 4 mg/2 mL injection 4 mg 0 mg Does not apply Given to EMS      10/07/2024 1815 iodixanol (VISIPAQUE) 320 MG/ML injection 100 mL 100 mL Intravenous Given     10/07/2024 1800 fentaNYL injection 50 mcg Intravenous Given     10/07/2024 1805 fentaNYL injection 50 mcg Intravenous Given            Scheduled Medications:    Insulin Humalog  IV Keppra  IV PPI     Continuous IV Infusions:  Insulin drip  Cardene drip  Levophed drip  Propofol drip  Vasopressin drip.      PRN Meds:  fentaNYL, 50 mcg, Intravenous, Q1H PRN  glycopyrrolate, 0.1 mg, Intravenous, Q4H PRN  haloperidol lactate, 0.5 mg, Intravenous, Q2H PRN  hydrALAZINE, 10 mg, Intravenous, Q4H PRN - x 1 10/7  HYDROmorphone, 0.3 mg, Intravenous, Q2H PRN  LORazepam, 1 mg, Intravenous, Q10 Min PRN      ED Triage Vitals [10/07/24 1746]   Temperature Pulse Respirations Blood Pressure SpO2 Pain Score   99.9 °F (37.7 °C) (!) 54 12 (!) 215/107 100 % --     Weight (last 2 days)       Date/Time Weight    10/08/24 0600 107 (236.77)    10/07/24 1746 111 (245.37)            Vital Signs (last 3 days)       Date/Time Temp Pulse Resp BP MAP (mmHg) Arterial Line BP MAP SpO2 O2 Device Patient Position - Orthostatic VS Dariel Coma Scale Score    10/08/24 1000 98.6 °F (37 °C) 116 17 104/85 94 116/68 84 mmHg 98 % -- -- --    10/08/24 0916 98.6 °F (37 °C) 94 17 158/90 118 134/84 106 mmHg 100 % -- -- --    10/08/24 0845 98.6 °F (37 °C) 96 21 159/100 117 152/96 120 mmHg 100 % -- -- --    10/08/24 0800 98.6 °F (37 °C) 88 17 123/74 92 118/76 92 mmHg 100 % -- -- 3    10/08/24 0750 98.6 °F (37 °C) 92 17 133/81 94 122/78 96 mmHg 100 % -- -- --    10/08/24 0740 98.6 °F (37 °C) 88 17 133/73 94 118/76 94 mmHg 100 % -- -- --    10/08/24 0730 98.2 °F (36.8 °C) 88 18 124/74 91 116/74 92 mmHg 100 % -- -- --    10/08/24 0700 98.2 °F (36.8 °C) 88 17 137/70 96 118/76 94 mmHg 100 % -- -- 3    10/08/24 0600 98.2 °F (36.8 °C) 82 17 116/73 92 108/72 86 mmHg 100 % -- -- 3    10/08/24 0500 97.9 °F (36.6 °C) 78 17 102/68 80 92/62 76 mmHg 100 % -- -- 3    10/08/24 0406 -- --  -- -- -- -- -- 100 % -- -- --    10/08/24 0400 97.5 °F (36.4 °C) 76 17 83/58 70 80/56 66 mmHg 100 % Ventilator Lying 3    10/08/24 0300 98.2 °F (36.8 °C) 88 8 82/62 70 80/56 66 mmHg 100 % -- -- 3    10/08/24 0200 -- 98 17 86/65 76 92/64 74 mmHg 100 % -- -- 3    10/08/24 0100 -- 92 17 111/75 83 92/64 74 mmHg 100 % -- -- 3    10/08/24 0000 98 °F (36.7 °C) 94 18 105/73 94 90/62 72 mmHg 100 % Ventilator Lying 3    10/07/24 2339 -- -- -- -- -- -- -- 100 % -- -- --    10/07/24 2300 -- 98 17 113/76 86 94/62 74 mmHg 100 % -- -- 3    10/07/24 2215 -- 106 17 111/67 82 94/62 74 mmHg 100 % -- -- --    10/07/24 2200 -- 92 17 103/58 70 84/52 64 mmHg 100 % -- -- 3    10/07/24 2100 -- 140 18 113/72 86 86/56 66 mmHg 100 % -- -- 3    10/07/24 2022 -- -- -- 195/99 -- -- -- -- -- -- --    10/07/24 2000 99.4 °F (37.4 °C) 82 19 181/79 107 154/70 92 mmHg 100 % Ventilator Lying 3    10/07/24 1845 -- -- -- -- -- -- -- -- -- -- 3    10/07/24 1836 99.2 °F (37.3 °C) 64 20 186/93 128 -- -- 100 % -- -- --    10/07/24 1823 -- -- -- -- -- -- -- 10 % -- -- --    10/07/24 1800 -- -- 12 180/86 124 -- -- 99 % Ventilator -- --    10/07/24 1755 -- 48 12 180/86 -- -- -- 100 % None (Room air) Lying 3    10/07/24 1746 99.9 °F (37.7 °C) 54 12 215/107 -- -- -- 100 % None (Room air) Lying 3              Pertinent Labs/Diagnostic Test Results:   Radiology:  XR chest portable   Final Interpretation by [place] MD (10/08 0613)      No acute cardiopulmonary disease.      TRAUMA - CT spine cervical wo contrast   Final Interpretation by E. Alec Schoenberger, MD (10/07 1929)      No cervical spine fracture or traumatic malalignment.      TRAUMA - CT chest abdomen pelvis w contrast   Final Interpretation by E. Alec Schoenberger, MD (10/07 1938)   No traumatic injury.   Endotracheal and orogastric tubes in position.   Bibasilar consolidation favored to represent atelectasis.   Nodular opacities in the right upper and right lower lobe are likely infectious  and may be due to aspiration   Right hydronephrosis with urothelial enhancement and periureteral stranding may be due to infection or recently passed stone.      CT head wo contrast   Final Interpretation by E. Alec Schoenberger, MD (10/07 1928)   Acute parenchymal hematoma centered in the left basal ganglia with extension to the left frontal lobe and ventricles with mild edema and mass effect with 6 mm of left to right shift and effacement of the basal cisterns.   Mild hydrocephalus      CTA head and neck w wo contrast   Final Interpretation by E. Alec Schoenberger, MD (10/07 1932)         CT Angiography: No significant stenosis of the cervical carotid or vertebral arteries.   No significant intracranial stenosis, large vessel occlusion or aneurysm   Active contrast extravasation into the left basal ganglia hematoma.      XR Trauma multiple (SLB/SLRA trauma bay ONLY)   Final Interpretation by E. Alec Schoenberger, MD (10/07 1933)      No acute cardiopulmonary disease within limitations of supine imaging.   Tubes in satisfactory position.      Unremarkable pelvis      XR chest 1 view   Final Interpretation by E. Alec Schoenberger, MD (10/07 1933)      No acute cardiopulmonary disease within limitations of supine imaging.   Tubes in satisfactory position.      Unremarkable pelvis      XR pelvis ap only 1 or 2 vw   Final Interpretation by E. Alec Schoenberger, MD (10/07 1933)      No acute cardiopulmonary disease within limitations of supine imaging.   Tubes in satisfactory position.      Unremarkable pelvis     Cardiology:  No orders to display     GI:  No orders to display           Results from last 7 days   Lab Units 10/08/24  0609 10/07/24  2245 10/07/24  2024 10/07/24  1823   WBC Thousand/uL 84.12* 75.30* 97.58* 95.14*   HEMOGLOBIN g/dL 9.8* 10.3* 11.9* 11.2*   HEMATOCRIT % 31.8* 32.7* 37.1 36.3*   PLATELETS Thousands/uL 7* 8* 8* 7*   TOTAL NEUT ABS Thousand/uL  --   --  8.27*  --    BANDS PCT %  --   --  3 3          Results from last 7 days   Lab Units 10/08/24  0609 10/07/24  2147 10/07/24  2024 10/07/24  1823   SODIUM mmol/L 149* 140 138 136   POTASSIUM mmol/L 3.9 4.1 3.7 3.6   CHLORIDE mmol/L 116* 106 101 101   CO2 mmol/L 24 22 23 20*   ANION GAP mmol/L 9 12 14* 15*   BUN mg/dL 17 16 16 16   CREATININE mg/dL 1.22 1.28 1.19 1.21   EGFR ml/min/1.73sq m 72 68 74 72   CALCIUM mg/dL 8.2* 7.9* 8.6 8.5   CALCIUM, IONIZED mmol/L 1.12  --   --   --    MAGNESIUM mg/dL 2.4  --   --  2.0   PHOSPHORUS mg/dL 2.6*  --   --  4.0     Results from last 7 days   Lab Units 10/07/24  1823   AST U/L 40*   ALT U/L 26   ALK PHOS U/L 85   TOTAL PROTEIN g/dL 6.9   ALBUMIN g/dL 3.8   TOTAL BILIRUBIN mg/dL 1.38*     Results from last 7 days   Lab Units 10/08/24  1037 10/08/24  0809 10/08/24  0556 10/08/24  0406 10/08/24  0156 10/07/24  2338   POC GLUCOSE mg/dl 170* 175* 153* 179* 240* 235*     Results from last 7 days   Lab Units 10/08/24  0609 10/07/24  2147 10/07/24  2024 10/07/24  1823   GLUCOSE RANDOM mg/dL 154* 349* 394* 260*     Results from last 7 days   Lab Units 10/08/24  0609 10/07/24  2147 10/07/24  2031   OSMOLALITY, SERUM mmol/* 322* 326*     Results from last 7 days   Lab Units 10/07/24  1922   HEMOGLOBIN A1C % 5.2   EAG mg/dl 103     Results from last 7 days   Lab Units 10/07/24  1922   PH ART  7.331*   PCO2 ART mm Hg 45.5*   PO2 ART mm Hg 92.5   HCO3 ART mmol/L 23.5   BASE EXC ART mmol/L -2.5   O2 CONTENT ART mL/dL 17.0   O2 HGB, ARTERIAL % 96.8   ABG SOURCE  Line, Arterial       Results from last 7 days   Lab Units 10/07/24  1823   PROTIME seconds 15.9*   INR  1.24*   PTT seconds 25             Results from last 7 days   Lab Units 10/07/24  2024 10/07/24  1922 10/07/24  1823   LACTIC ACID mmol/L 2.0 1.5 3.9*       Results from last 7 days   Lab Units 10/08/24  0609 10/07/24  2147 10/07/24  2031   OSMOLALITY, SERUM mmol/* 322* 326*     Results from last 7 days   Lab Units 10/08/24  0609   CLARITY UA  Extra Turbid    COLOR UA  Red   SPEC GRAV UA  1.035*   PH UA  6.0   GLUCOSE UA mg/dl Negative   KETONES UA mg/dl Negative   BLOOD UA  Large*   PROTEIN UA mg/dl 200 (2+)*   NITRITE UA  Negative   BILIRUBIN UA  Negative   UROBILINOGEN UA (BE) mg/dl <2.0   LEUKOCYTES UA  Trace*   WBC UA /hpf None Seen   RBC UA /hpf Innumerable*   BACTERIA UA /hpf None Seen   EPITHELIAL CELLS WET PREP /hpf None Seen     No past medical history on file.  Present on Admission:   Intracranial hemorrhage (HCC)   Hematuria   Hematemesis   Hyperglycemia      Admitting Diagnosis: Cerebrovascular accident (CVA) due to other mechanism (HCC) [I63.89]  Unspecified multiple injuries, initial encounter [T07.XXXA]  Age/Sex: 44 y.o. male    Network Utilization Review Department  ATTENTION: Please call with any questions or concerns to [phone] and carefully listen to the prompts so that you are directed to the right person. All voicemails are confidential.   For Discharge needs, contact Care Management DC Support Team at [phone] opt. 2  Send all requests for admission clinical reviews, approved or denied determinations and any other requests to dedicated fax number below belonging to the campus where the patient is receiving treatment. List of dedicated fax numbers for the Facilities:  FACILITY NAME UR FAX NUMBER   ADMISSION DENIALS (Administrative/Medical Necessity) [phone]   DISCHARGE SUPPORT TEAM (NETWORK) [phone]   PARENT CHILD HEALTH (Maternity/NICU/Pediatrics) [phone]   [facility] [phone]   [facility] [phone]   [facility] [phone]   [facility] [phone]   [facility] [phone]   [facility] [phone]   [facility] [phone]   [facility] [phone]   [facility]  [phone]   [facility] [phone]   [facility] [phone]   [facility] [phone]     ;

## 2024-10-08 NOTE — DEATH NOTE
INPATIENT DEATH NOTE  Saroj Gómez 44 y.o. male MRN: [id number]  Unit/Bed#: ICU 09 Encounter: [id number]             PHYSICAL EXAM:  Unresponsive to noxious stimuli, Spontaneous respirations absent, Breath sounds absent, Carotid pulse absent, Heart sounds absent, Pupillary light reflex absent, and Corneal blink reflex absent    Medical Examiner notification criteria:  Patient  within 24 hours of arrival to hospital   Medical Examiner's office notified?:  Yes   Medical Examiner accepted case?:  No  Name of Medical Examiner: ...         Autopsy Options:  Decision for post-mortem examination not yet made by next of kin.    Primary Service Attending Physician notified?:  yes - Attending:  Keegan De León MD    Physician/Resident responsible for completing Discharge Summary:  German Holland DO    Time of Death: 1143

## 2024-10-08 NOTE — DISCHARGE SUMMARY
Discharge Summary - Critical Care/ICU   Name: Saroj Gómez 44 y.o. male I MRN: [id number]  Unit/Bed#: ICU 09 I Date of Admission: 10/7/2024   Date of Service: 10/8/2024 I Hospital Day: 1    Admission Date: 10/7/2024   Admitting Diagnosis: Cerebrovascular accident (CVA) due to other mechanism (HCC) [I63.89]  Unspecified multiple injuries, initial encounter [T07.XXXA]  Discharge Date: 10/08/24    HPI: See hospital course.    Procedures Performed:   Orders Placed This Encounter   Procedures    Central Line    Intubation       Summary of Hospital Course:  Saroj Gómez was a 44 year old male who presented to the [facility] ED for a fall at home after a sudden onset LOC, seizures and posturing. The patient does not have any PMH of seizures. The patient has a PMH of HTN and some reported drug use in the past. The patient's GCS on arrival was 3 and his left pupil was fixed and dilated and the right pupil was pinpoint. The patient was intubated and a CT was done which showed a left basal ganglia hemorrhage extending into the left frontal lobe and there was also intraventricular hemorrhage and early hydrocephalus. There was also a small amount of subarachnoid hemorrhage. The patient was admitted to the ICU and had bilateral fixed and dilated pupils. The patient was not following any commands. The patient was withdrawing to pain in all extremities reportedly and did have a gag reflex. Patient's labs were 95 and platelet count was 7. The patient was placed on a cardene drip for htn and propofol drip. The patient had a seizure like event with BLE posturing and ativan was administered. Discussion with the family was held and they chose not to follow any aggressive interventions. Neurosurgical intervention was also declined. 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 The patient was placed on comfort care when the family was ready. When the family was  gathered and ready the patient was extubated. Time of Death - 1143 on 10/8/24.    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     Medical Problems        Condition at Time of Death: Brain dead on ventilator support      Death Note:  INPATIENT DEATH NOTE  Saroj Gómez 44 y.o. male MRN: [id number]  Unit/Bed#: ICU 09 Encounter: [id number]             PHYSICAL EXAM:  Unresponsive to noxious stimuli, Spontaneous respirations absent, Breath sounds absent, Carotid pulse absent, Heart sounds absent, Pupillary light reflex absent, and Corneal blink reflex absent    Medical Examiner notification criteria:  Patient  within 24 hours of arrival to hospital   Medical Examiner's office notified?:  Yes   Medical Examiner accepted case?:  No  Name of Medical Examiner: ...         Autopsy Options:  Decision for post-mortem examination not yet made by next of kin.    Primary Service Attending Physician notified?:  yes - Attending:  Keegan De León MD    Physician/Resident responsible for completing Discharge Summary:  German Holland DO    Time of Death: 1143

## 2024-10-08 NOTE — H&P
H&P - Trauma   Name: Saroj Gómez 44 y.o. male I MRN: [id number]  Unit/Bed#: ICU 09 I Date of Admission: 10/7/2024   Date of Service: 10/8/2024 I Hospital Day: 1     Assessment & Plan  Intracranial hemorrhage (HCC)  - CT Head: Acute parenchymal hematoma centered in the left basal ganglia with extension to the left frontal lobe and ventricles with mild edema and mass effect with 6 mm of left to right shift and effacement of the basal cisterns.  Mild hydrocephalus   - PT bradycardic and hypertensive with blown left pupil  - Head of bed was elevated at 30 degrees  - Fentanyl 50 mg given   - loading dose of keppra 1,000 mg given     Trauma Alert: Level A   Model of Arrival: Ambulance    Trauma Team: Attending Dr. Lucas and Residents Dr. Nova  Consultants:     Neurosurgery:   - STAT consult; notified at 6:20 via in person;     History of Present Illness   Chief Complaint: AMS, Seizure, fall  Mechanism:Fall     Saroj Gómez is a 44 y.o. male with pmhx of drug abuse, HTN, and Asthma who presents as a level a trauma for AMS, seizure, fall from standing.  Patient was coming back from a doctor's appointment, once at the house patient was altered and was not acting himself per his parents.  Patient then fell to the ground and had seizure-like activity for about 10 minutes, witnessed by parents. Upon arrival to the [facility] pt has GCS of 3.     Review of Systems   Unable to perform ROS: Acuity of condition     I have reviewed the patient's PMH, PSH, Social History, Family History, Meds, and Allergies    There is no immunization history on file for this patient.  Last Tetanus: not indicated at this time       Objective :  Temp:  [98 °F (36.7 °C)-99.9 °F (37.7 °C)] 98 °F (36.7 °C)  HR:  [] 92  BP: (103-215)/() 111/75  Resp:  [12-20] 17  SpO2:  [10 %-100 %] 100 %  O2 Device: Ventilator    Initial Vitals:   Temperature: 99.9 °F (37.7 °C) (10/07/24 1746)  Pulse: (!) 54 (10/07/24 1746)  Respirations: 12 (10/07/24  1746)  Blood Pressure: (!) 215/107 (10/07/24 1746)  Arterial Line BP: 154/70 (10/07/24 2000)    Primary Survey:   Airway:        Status: compromised;        Pre-hospital Interventions: positioning        Hospital Interventions: intubated in ED/trauma bay  Breathing:        Pre-hospital Interventions: assisted ventilation       Effort: agonal       Right breath sounds: normal       Left breath sounds: normal  Circulation:        Rhythm: regular       Rate: regular   Right Pulses Left Pulses    R radial: 2+    R pedal: 2+     L radial: 2+    L pedal: 2+       Disability:        GCS: Eye: 1; Verbal: 1 Motor: 1 Total: 3T;        Right Pupil: 2 mm;  round;  not reactive         Left Pupil:  5 mm;  round;  not reactive      R Motor Strength L Motor Strength    R : 0/5  R dorsiflex: 0/5  R plantarflex: 0/5 L : 0/5  L dorsiflex: 0/5  L plantarflex: 0/5        Sensory deficit: unable to asses due to acuity of condition.  Exposure:       Completed: Yes      Secondary Survey:  Physical Exam  Vitals and nursing note reviewed.   Constitutional:       Appearance: He is ill-appearing and toxic-appearing.      Comments: Exam limited. Pt with GCS of three prior to intubation.   HENT:      Head:      Comments: Ecchymosis 2x2 at right eyebrow        Right Ear: External ear normal.      Left Ear: External ear normal.      Nose: Nose normal. No congestion.      Mouth/Throat:      Mouth: Mucous membranes are dry.   Eyes:      Comments: Right pupil 2 mm, fixed  Left pupil 5 mm, non reactive fixed   Cardiovascular:      Rate and Rhythm: Bradycardia present.   Pulmonary:      Breath sounds: Normal breath sounds. No decreased breath sounds, wheezing, rhonchi or rales.      Comments: Bagged by EMS  Chest:      Comments: Ecchymosis on right side of chest  Abdominal:      Palpations: Abdomen is soft.   Musculoskeletal:      Comments: Bilateral knee abrasions    Skin:     General: Skin is warm.      Findings: Bruising present.    Neurological:      Mental Status: He is unresponsive.      GCS: GCS eye subscore is 1. GCS verbal subscore is 1. GCS motor subscore is 1.         Lines/Drains/Airways       Active Status       Name Placement date Placement time Site Days    CVC Central Lines 10/07/24 Triple 16cm 10/07/24  2225  --  less than 1    Arterial Line 10/07/24 Radial 10/07/24  1930  Radial  less than 1    ETT  Cuffed 8 mm 10/07/24  1759  -- less than 1    NG/OG/Enteral Tube Orogastric 14 Fr Left mouth 10/07/24  1751  Left mouth  less than 1    Urethral Catheter Latex 16 Fr. 10/07/24  1830  Latex  less than 1                    Lab Results: I have reviewed the following results:  Recent Labs     10/07/24  1823 10/07/24  1922 10/07/24  2024 10/07/24  2147 10/07/24  2245   WBC 95.14*  --  97.58*  --  75.30*   HGB 11.2*  --  11.9*  --  10.3*   HCT 36.3*  --  37.1  --  32.7*   PLT 7*  --  8*  --  8*   SODIUM 136  --  138 140  --    K 3.6  --  3.7 4.1  --      --  101 106  --    CO2 20*  --  23 22  --    BUN 16  --  16 16  --    CREATININE 1.21  --  1.19 1.28  --    GLUC 260*  --  394* 349*  --    MG 2.0  --   --   --   --    PHOS 4.0  --   --   --   --    AST 40*  --   --   --   --    ALT 26  --   --   --   --    ALB 3.8  --   --   --   --    TBILI 1.38*  --   --   --   --    ALKPHOS 85  --   --   --   --    PTT 25  --   --   --   --    INR 1.24*  --   --   --   --    LACTICACID 3.9*   < > 2.0  --   --     < > = values in this interval not displayed.       Imaging Results: I have personally reviewed pertinent images saved in PACS. CT scan findings (and other pertinent positive findings on images) were discussed with radiology. My interpretation of the images/reports are as follows:  Chest Xray(s): No acute findings, intubation in satisfactory position   FAST exam(s): negative for acute findings   CT Scan(s): positive for acute findings: Acute parenchymal hematoma centered in the left basal ganglia with extension to the left frontal lobe  and ventricles with mild edema and mass effect with 6 mm of left to right shift and effacement of the basal cisterns.   Additional Xray(s): negative for acute findings     Other Studies: Other Study Results Review: No additional pertinent studies reviewed.

## 2024-10-08 NOTE — PROCEDURES
Central Line Insertion    Date/Time: 10/7/2024 10:25 PM    Performed by: Henrietta Robledo PA-C  Authorized by: Henrietta Robledo PA-C    Patient location:  ICU (ICU 9)  Other Assisting Provider: Yes (comment) (Grecia Kuhn PA-C)    Consent:     Consent obtained:  Written    Consent given by:  Parent    Risks discussed:  Arterial puncture, incorrect placement, bleeding, infection and pneumothorax    Alternatives discussed:  No treatment and delayed treatment  Universal protocol:     Procedure explained and questions answered to patient or proxy's satisfaction: yes      Immediately prior to procedure, a time out was called: yes      Patient identity confirmed:  Arm band and hospital-assigned identification number  Pre-procedure details:     Hand hygiene: Hand hygiene performed prior to insertion      Sterile barrier technique: All elements of maximal sterile technique followed      Skin preparation:  2% chlorhexidine    Skin preparation agent: Skin preparation agent completely dried prior to procedure    Indications:     Central line indications: medications requiring central line    Procedure details:     Location:  Right internal jugular    Vessel type: vein      Laterality:  Right    Approach: percutaneous technique used      Patient position:  Trendelenburg    Catheter type:  Triple lumen 16cm    Catheter size:  7 Fr    Ultrasound guidance: yes      Ultrasound image availability:  Images available in PACS    Sterile ultrasound techniques: Sterile gel and sterile probe covers were used      Manometry confirmation: yes      Number of attempts:  1    Successful placement: yes      Catheter tip vessel location: atriocaval junction    Post-procedure details:     Post-procedure:  Dressing applied and line sutured    Assessment:  Blood return through all ports    Post-procedure complications: none      Patient tolerance of procedure:  Tolerated well, no immediate complications

## 2024-10-08 NOTE — PLAN OF CARE
Problem: PAIN - ADULT  Goal: Verbalizes/displays adequate comfort level or baseline comfort level  Description: Interventions:  - Encourage patient to monitor pain and request assistance  - Assess pain using appropriate pain scale  - Administer analgesics based on type and severity of pain and evaluate response  - Implement non-pharmacological measures as appropriate and evaluate response  - Consider cultural and social influences on pain and pain management  - Notify physician/advanced practitioner if interventions unsuccessful or patient reports new pain  Outcome: Progressing     Problem: INFECTION - ADULT  Goal: Absence or prevention of progression during hospitalization  Description: INTERVENTIONS:  - Assess and monitor for signs and symptoms of infection  - Monitor lab/diagnostic results  - Monitor all insertion sites, i.e. indwelling lines, tubes, and drains  - Monitor endotracheal if appropriate and nasal secretions for changes in amount and color  - [place] appropriate cooling/warming therapies per order  - Administer medications as ordered  - Instruct and encourage patient and family to use good hand hygiene technique  - Identify and instruct in appropriate isolation precautions for identified infection/condition  Outcome: Progressing  Goal: Absence of fever/infection during neutropenic period  Description: INTERVENTIONS:  - Monitor WBC    Outcome: Progressing     Problem: SAFETY ADULT  Goal: Patient will remain free of falls  Description: INTERVENTIONS:  - Educate patient/family on patient safety including physical limitations  - Instruct patient to call for assistance with activity   - Consult OT/PT to assist with strengthening/mobility   - Keep Call bell within reach  - Keep bed low and locked with side rails adjusted as appropriate  - Keep care items and personal belongings within reach  - Initiate and maintain comfort rounds  - Make Fall Risk Sign visible to staff  - Apply yellow socks and bracelet  for high fall risk patients  - Consider moving patient to room near nurses station  Outcome: Progressing  Goal: Maintain or return to baseline ADL function  Description: INTERVENTIONS:  -  Assess patient's ability to carry out ADLs; assess patient's baseline for ADL function and identify physical deficits which impact ability to perform ADLs (bathing, care of mouth/teeth, toileting, grooming, dressing, etc.)  - Assess/evaluate cause of self-care deficits   - Assess range of motion  - Assess patient's mobility; develop plan if impaired  - Assess patient's need for assistive devices and provide as appropriate  - Encourage maximum independence but intervene and supervise when necessary  - Involve family in performance of ADLs  - Assess for home care needs following discharge   - Consider OT consult to assist with ADL evaluation and planning for discharge  - Provide patient education as appropriate  Outcome: Progressing  Goal: Maintains/Returns to pre admission functional level  Description: INTERVENTIONS:  - Perform AM-PAC 6 Click Basic Mobility/ Daily Activity assessment daily.  - Set and communicate daily mobility goal to care team and patient/family/caregiver.   - Collaborate with rehabilitation services on mobility goals if consulted  - Out of bed for toileting  - Record patient progress and toleration of activity level   Outcome: Progressing     Problem: DISCHARGE PLANNING  Goal: Discharge to home or other facility with appropriate resources  Description: INTERVENTIONS:  - Identify barriers to discharge w/patient and caregiver  - Arrange for needed discharge resources and transportation as appropriate  - Identify discharge learning needs (meds, wound care, etc.)  - Arrange for interpretive services to assist at discharge as needed  - Refer to Case Management Department for coordinating discharge planning if the patient needs post-hospital services based on physician/advanced practitioner order or complex needs  related to functional status, cognitive ability, or social support system  Outcome: Progressing     Problem: Knowledge Deficit  Goal: Patient/family/caregiver demonstrates understanding of disease process, treatment plan, medications, and discharge instructions  Description: Complete learning assessment and assess knowledge base.  Interventions:  - Provide teaching at level of understanding  - Provide teaching via preferred learning methods  Outcome: Progressing

## 2024-10-08 NOTE — RESPIRATORY THERAPY NOTE
RT Ventilator Management Note  Saroj Gómez 44 y.o. male MRN: [id number]  Unit/Bed#: ICU 09 Encounter: [id number]      Daily Screen         10/7/2024  1823 10/8/2024  0916          Patient safety screen outcome:: Failed Failed      Not Ready for Weaning due to:: Underline problem not resolved Underline problem not resolved                Physical Exam:   Assessment Type: Assess only  General Appearance: Unresponsive  Respiratory Pattern: Assisted  Chest Assessment: Chest expansion symmetrical  Bilateral Breath Sounds: Clear      Resp Comments: Pt found on s(cmv) rr 18 vt 500 peep 6 fio2 dropped to 40%, no other vent changes made at this time. Will continue to monitor pt per resp protocol,

## 2024-10-08 NOTE — ACP (ADVANCE CARE PLANNING)
Spoke with family in conference room about overall poor prognosis. Expressed my concern that the patient's brain bleed may be worsening causing herniation and that the patient is now requiring 2 vasopressors to support his blood pressure as a result. I discussed that his heart may stop beating tonight and discussed CPR and cardioversion should his heart stop beating. Given the patient's overall poor neurologic prognosis the family would not want CPR. At this point, the family would like medical management to support him as they are potentially considering organ donation. They are also additionally waiting for family to arrive as well.     Code Status changed to Level 2 - DNR

## 2024-10-08 NOTE — RESPIRATORY THERAPY NOTE
RT Ventilator Management Note  Saroj Gómez 44 y.o. male MRN: [id number]  Unit/Bed#: ICU 09 Encounter: [id number]      Daily Screen         10/7/2024  4324             Patient safety screen outcome:: Failed    Not Ready for Weaning due to:: Underline problem not resolved              Physical Exam:   Assessment Type: (P) Assess only  General Appearance: Unresponsive, Sedated  Respiratory Pattern: Assisted  Chest Assessment: Chest expansion symmetrical  Bilateral Breath Sounds: (P) Clear      Resp Comments: (P) Pt. doing well on CMV. FiO2 titrated to 45%, Vt increased to 500 and rate decreased to 18. No other changes throughout the night.

## 2024-10-08 NOTE — RESPIRATORY THERAPY NOTE
Resp Therapy   10/08/24 1121   Respiratory Assessment   Resp Comments Pt extubated for comfort care without issues.   Vent Information   Ventilator End Yes

## 2024-10-09 LAB
ABO GROUP BLD: NORMAL
ABO GROUP BLD: NORMAL
BASE EXCESS BLDA CALC-SCNC: -1 MMOL/L (ref -2–3)
BLD GP AB SCN SERPL QL: NEGATIVE
BLD GP AB SCN SERPL QL: NEGATIVE
CA-I BLD-SCNC: 0.94 MMOL/L (ref 1.12–1.32)
GLUCOSE SERPL-MCNC: 272 MG/DL (ref 65–140)
HCO3 BLDA-SCNC: 22.5 MMOL/L (ref 24–30)
HCT VFR BLD CALC: 38 % (ref 36.5–49.3)
HGB BLDA-MCNC: 12.9 G/DL (ref 12–17)
PCO2 BLD: 23 MMOL/L (ref 21–32)
PCO2 BLD: 31.5 MM HG (ref 42–50)
PH BLD: 7.46 [PH] (ref 7.3–7.4)
PO2 BLD: 65 MM HG (ref 35–45)
POTASSIUM BLD-SCNC: 5.2 MMOL/L (ref 3.5–5.3)
RH BLD: POSITIVE
RH BLD: POSITIVE
SAO2 % BLD FROM PO2: 94 % (ref 60–85)
SODIUM BLD-SCNC: 135 MMOL/L (ref 136–145)
SPECIMEN EXPIRATION DATE: NORMAL
SPECIMEN SOURCE: ABNORMAL

## 2024-10-09 NOTE — PROGRESS NOTES
Father Cecile gave last Rites and Prayer of Commendation   10/09/24 0900   Clinical Encounter Type   Visited With Patient and family together   Amish Encounters   Amish Needs Prayer   Sacramental Encounters   Sacrament Other Other (Comment)

## 2024-10-10 LAB
BASOPHILS # BLD AUTO: 0 X10E3/UL (ref 0–0.2)
BASOPHILS NFR BLD AUTO: 0 %
BLAST/BLAST LIKE CELLS (CD4): 24 % (ref 0–0)
CD3+CD4+ CELLS # BLD: 3704 /UL (ref 359–1519)
CD3+CD4+ CELLS NFR BLD: 44.1 % (ref 30.8–58.5)
EOSINOPHIL # BLD AUTO: 0.8 X10E3/UL (ref 0–0.4)
EOSINOPHIL NFR BLD AUTO: 1 %
ERYTHROCYTE [DISTWIDTH] IN BLOOD BY AUTOMATED COUNT: 16.7 % (ref 11.6–15.4)
HCT VFR BLD AUTO: 33.2 % (ref 37.5–51)
HGB BLD-MCNC: 10.2 G/DL (ref 13–17.7)
LYMPHOCYTES # BLD AUTO: 8.4 X10E3/UL (ref 0.7–3.1)
LYMPHOCYTES NFR BLD AUTO: 11 %
MCH RBC QN AUTO: 30.6 PG (ref 26.6–33)
MCHC RBC AUTO-ENTMCNC: 30.7 G/DL (ref 31.5–35.7)
MCV RBC AUTO: 100 FL (ref 79–97)
METAMYELOCYTES (CD4): 1 % (ref 0–0)
MONOCYTES # BLD AUTO: 38.9 X10E3/UL (ref 0.1–0.9)
MONOCYTES NFR BLD AUTO: 51 %
MORPHOLOGY BLD-IMP: ABNORMAL
MYELOCYTES (CD4): 3 % (ref 0–0)
NEUTROPHILS # BLD AUTO: 6.9 X10E3/UL (ref 1.4–7)
NEUTROPHILS NFR BLD AUTO: 9 %
NRBC BLD AUTO-RTO: 7 % (ref 0–0)
PLATELET # BLD AUTO: 8 X10E3/UL (ref 150–450)
RBC # BLD AUTO: 3.33 X10E6/UL (ref 4.14–5.8)
SL AMB IMMATURE CELLS: ABNORMAL
WBC # BLD AUTO: 76.2 X10E3/UL (ref 3.4–10.8)

## 2024-10-14 ENCOUNTER — TELEPHONE (OUTPATIENT)
Age: 44
End: 2024-10-14

## 2024-10-14 NOTE — TELEPHONE ENCOUNTER
Sister called to makes sure we knew pt has past away.  I let her know his chart does show that.  She wanted to makes sure he had no future appts scheduled.  I let her know that if he did they all had been cancelled and there is no open appts scheduled so she does not need to call all his Dr offices, at least within the [facility] network.  She did ask for info on his appt with ENT.  She already knew he had been seen and what the general concern for the appt was, but I told her I really couldn't give her any info due to HIPAA laws.  I did get her the phone number for medical records and told her she can call them and find out what she needs to do to get all his records, [phone]

## 2024-10-14 NOTE — TELEPHONE ENCOUNTER
Sister called to inform Dr William, patient has passed away. Wanted to be sure any upcoming apts had been cancelled.

## 2024-10-14 NOTE — TELEPHONE ENCOUNTER
Sister called to makes sure we knew pt has past away.  I let her know his chart does show that.  She wanted to makes sure he had no future appts scheduled.  I let her know that if he did they all had been cancelled and there is no open appts scheduled so she does not need to call all his Dr offices, at least within the [facility] network.  She did ask for info on his appt with ENT.  She already knew he had been seen and what the general concern for the appt was, but I told her I really couldn't give her any info due to HIPAA laws.  I did get her the phone number for medical records and told her she can call them and find out what she needs to do to get all his records, [phone]